# Patient Record
Sex: MALE | Race: WHITE | Employment: FULL TIME | URBAN - METROPOLITAN AREA
[De-identification: names, ages, dates, MRNs, and addresses within clinical notes are randomized per-mention and may not be internally consistent; named-entity substitution may affect disease eponyms.]

---

## 2017-01-03 PROBLEM — R07.9 CHEST PAIN: Status: ACTIVE | Noted: 2017-01-03

## 2017-01-03 PROBLEM — R31.9 HEMATURIA: Status: ACTIVE | Noted: 2017-01-03

## 2021-09-04 ENCOUNTER — HOSPITAL ENCOUNTER (EMERGENCY)
Age: 51
Discharge: HOME | End: 2021-09-04
Attending: EMERGENCY MEDICINE
Payer: COMMERCIAL

## 2021-09-04 ENCOUNTER — APPOINTMENT (OUTPATIENT)
Dept: GENERAL RADIOLOGY | Age: 51
End: 2021-09-04
Attending: EMERGENCY MEDICINE
Payer: COMMERCIAL

## 2021-09-04 VITALS
HEIGHT: 74 IN | TEMPERATURE: 99 F | RESPIRATION RATE: 18 BRPM | DIASTOLIC BLOOD PRESSURE: 69 MMHG | OXYGEN SATURATION: 99 % | WEIGHT: 245 LBS | HEART RATE: 83 BPM | SYSTOLIC BLOOD PRESSURE: 120 MMHG | BODY MASS INDEX: 31.44 KG/M2

## 2021-09-04 DIAGNOSIS — U07.1 COVID-19: Primary | ICD-10-CM

## 2021-09-04 LAB
ANION GAP SERPL CALC-SCNC: 8 MMOL/L (ref 7–16)
BASOPHILS # BLD: 0 K/UL (ref 0–0.2)
BASOPHILS NFR BLD: 0 % (ref 0–2)
BUN SERPL-MCNC: 16 MG/DL (ref 6–23)
CALCIUM SERPL-MCNC: 8.5 MG/DL (ref 8.3–10.4)
CHLORIDE SERPL-SCNC: 98 MMOL/L (ref 98–107)
CO2 SERPL-SCNC: 25 MMOL/L (ref 21–32)
CREAT SERPL-MCNC: 1.13 MG/DL (ref 0.8–1.5)
DIFFERENTIAL METHOD BLD: ABNORMAL
EOSINOPHIL # BLD: 0 K/UL (ref 0–0.8)
EOSINOPHIL NFR BLD: 0 % (ref 0.5–7.8)
ERYTHROCYTE [DISTWIDTH] IN BLOOD BY AUTOMATED COUNT: 12.5 % (ref 11.9–14.6)
GLUCOSE SERPL-MCNC: 102 MG/DL (ref 65–100)
HCT VFR BLD AUTO: 37.2 % (ref 41.1–50.3)
HGB BLD-MCNC: 13 G/DL (ref 13.6–17.2)
IMM GRANULOCYTES # BLD AUTO: 0 K/UL (ref 0–0.5)
IMM GRANULOCYTES NFR BLD AUTO: 0 % (ref 0–5)
LYMPHOCYTES # BLD: 0.6 K/UL (ref 0.5–4.6)
LYMPHOCYTES NFR BLD: 16 % (ref 13–44)
MCH RBC QN AUTO: 32.4 PG (ref 26.1–32.9)
MCHC RBC AUTO-ENTMCNC: 34.9 G/DL (ref 31.4–35)
MCV RBC AUTO: 92.8 FL (ref 79.6–97.8)
MONOCYTES # BLD: 0.2 K/UL (ref 0.1–1.3)
MONOCYTES NFR BLD: 5 % (ref 4–12)
NEUTS SEG # BLD: 3.1 K/UL (ref 1.7–8.2)
NEUTS SEG NFR BLD: 79 % (ref 43–78)
NRBC # BLD: 0 K/UL (ref 0–0.2)
PLATELET # BLD AUTO: 136 K/UL (ref 150–450)
PMV BLD AUTO: 10.6 FL (ref 9.4–12.3)
POTASSIUM SERPL-SCNC: 3.3 MMOL/L (ref 3.5–5.1)
RBC # BLD AUTO: 4.01 M/UL (ref 4.23–5.6)
SODIUM SERPL-SCNC: 131 MMOL/L (ref 136–145)
WBC # BLD AUTO: 4 K/UL (ref 4.3–11.1)

## 2021-09-04 PROCEDURE — 99283 EMERGENCY DEPT VISIT LOW MDM: CPT

## 2021-09-04 PROCEDURE — 71046 X-RAY EXAM CHEST 2 VIEWS: CPT

## 2021-09-04 PROCEDURE — 74011000258 HC RX REV CODE- 258: Performed by: EMERGENCY MEDICINE

## 2021-09-04 PROCEDURE — 96375 TX/PRO/DX INJ NEW DRUG ADDON: CPT

## 2021-09-04 PROCEDURE — 96374 THER/PROPH/DIAG INJ IV PUSH: CPT

## 2021-09-04 PROCEDURE — 74011250636 HC RX REV CODE- 250/636: Performed by: EMERGENCY MEDICINE

## 2021-09-04 PROCEDURE — 74011250637 HC RX REV CODE- 250/637: Performed by: EMERGENCY MEDICINE

## 2021-09-04 PROCEDURE — 74011000636 HC RX REV CODE- 636: Performed by: EMERGENCY MEDICINE

## 2021-09-04 PROCEDURE — 80048 BASIC METABOLIC PNL TOTAL CA: CPT

## 2021-09-04 PROCEDURE — 85025 COMPLETE CBC W/AUTO DIFF WBC: CPT

## 2021-09-04 PROCEDURE — M0243 CASIRIVI AND IMDEVI INFUSION: HCPCS

## 2021-09-04 RX ORDER — KETOROLAC TROMETHAMINE 30 MG/ML
30 INJECTION, SOLUTION INTRAMUSCULAR; INTRAVENOUS
Status: COMPLETED | OUTPATIENT
Start: 2021-09-04 | End: 2021-09-04

## 2021-09-04 RX ORDER — DEXAMETHASONE SODIUM PHOSPHATE 100 MG/10ML
10 INJECTION INTRAMUSCULAR; INTRAVENOUS ONCE
Status: COMPLETED | OUTPATIENT
Start: 2021-09-04 | End: 2021-09-04

## 2021-09-04 RX ORDER — ACETAMINOPHEN 500 MG
1000 TABLET ORAL
Status: COMPLETED | OUTPATIENT
Start: 2021-09-04 | End: 2021-09-04

## 2021-09-04 RX ADMIN — SODIUM CHLORIDE 1000 ML: 900 INJECTION, SOLUTION INTRAVENOUS at 21:34

## 2021-09-04 RX ADMIN — DEXAMETHASONE SODIUM PHOSPHATE 10 MG: 10 INJECTION INTRAMUSCULAR; INTRAVENOUS at 21:35

## 2021-09-04 RX ADMIN — CASIRIVIMAB AND IMDEVIMAB: 600; 600 INJECTION, SOLUTION, CONCENTRATE INTRAVENOUS at 22:03

## 2021-09-04 RX ADMIN — ACETAMINOPHEN 1000 MG: 500 TABLET, FILM COATED ORAL at 17:31

## 2021-09-04 RX ADMIN — KETOROLAC TROMETHAMINE 30 MG: 30 INJECTION, SOLUTION INTRAMUSCULAR; INTRAVENOUS at 21:34

## 2021-09-04 NOTE — ED TRIAGE NOTES
Pt here on day 7 of COVID. Feels as if he is getting confused at times and increased SOB with exertion. Did not get vaccines for covid. 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 Has no prior health conditions. Masked.

## 2021-09-05 NOTE — ED PROVIDER NOTES
Chief complaint : Covid concerns    HISTORY OF PRESENT ILLNESS :  Location : Generalized fatigue, fevers    Quality : Feels \"foggy headed    Quantity : Constant    Timing : About a week    Severity : Moderate    Context : Tested positive at [facility]    Alleviating / exacerbating factors : Little relief with Tylenol and Advil    Associated Symptoms : Diarrhea is resolved  Exertional dyspnea             Past Medical History:   Diagnosis Date    Gout     Hematuria, microscopic     Hypercholesterolemia        No past surgical history on file. Family History:   Problem Relation Age of Onset    Coronary Artery Disease Father        Social History     Socioeconomic History    Marital status:      Spouse name: Not on file    Number of children: Not on file    Years of education: Not on file    Highest education level: Not on file   Occupational History    Not on file   Tobacco Use    Smoking status: Former Smoker    Smokeless tobacco: Never Used    Tobacco comment: SMOKED PIPE   Substance and Sexual Activity    Alcohol use: Yes    Drug use: Not on file    Sexual activity: Not on file   Other Topics Concern    Not on file   Social History Narrative    Not on file     Social Determinants of Health     Financial Resource Strain:     Difficulty of Paying Living Expenses:    Food Insecurity:     Worried About Running Out of Food in the Last Year:     [address] in the Last Year:    Transportation Needs:     Lack of Transportation (Medical):      Lack of Transportation (Non-Medical):    Physical Activity:     Days of Exercise per Week:     Minutes of Exercise per Session:    Stress:     Feeling of Stress :    Social Connections:     Frequency of Communication with Friends and Family:     Frequency of Social Gatherings with Friends and Family:     Attends Tenri[employer] Services:     Active Member of Clubs or Organizations:     Attends Club or Organization Meetings:     Marital Status: Intimate Partner Violence:     Fear of Current or Ex-Partner:     Emotionally Abused:     Physically Abused:     Sexually Abused: ALLERGIES: Patient has no known allergies. Review of Systems   Constitutional: Positive for activity change, appetite change, fatigue and fever. Negative for chills. HENT: Negative for congestion, rhinorrhea and sore throat. Eyes: Negative for discharge and redness. Respiratory: Positive for shortness of breath. Negative for cough. Cardiovascular: Negative for chest pain and palpitations. Gastrointestinal: Negative for abdominal pain, diarrhea (Resolved), nausea and vomiting. Musculoskeletal: Negative for arthralgias, back pain and myalgias. Skin: Negative for rash. Neurological: Negative for dizziness and headaches. Psychiatric/Behavioral: Positive for decreased concentration. All other systems reviewed and are negative. Vitals:    09/04/21 1657   BP: 120/69   Pulse: 83   Resp: 18   Temp: (!) 102 °F (38.9 °C)   SpO2: 96%   Weight: 111.1 kg (245 lb)   Height: 6' 2\" (1.88 m)            Physical Exam  Vitals and nursing note reviewed. Constitutional:       General: He is not in acute distress. Appearance: Normal appearance. He is well-developed. He is not ill-appearing, toxic-appearing or diaphoretic. HENT:      Head: Normocephalic and atraumatic. Right Ear: External ear normal.      Left Ear: External ear normal.   Eyes:      General: No scleral icterus. Right eye: No discharge. Left eye: No discharge. Extraocular Movements: Extraocular movements intact. Conjunctiva/sclera: Conjunctivae normal.      Pupils: Pupils are equal, round, and reactive to light. Neck:      Thyroid: No thyromegaly. Trachea: Trachea normal.   Cardiovascular:      Rate and Rhythm: Normal rate and regular rhythm. Heart sounds: Normal heart sounds. No murmur heard. No gallop.     Pulmonary:      Effort: Pulmonary effort is normal. No respiratory distress. Breath sounds: Normal breath sounds. No wheezing or rales. Abdominal:      Palpations: Abdomen is soft. There is no hepatomegaly, splenomegaly or pulsatile mass. Tenderness: There is no abdominal tenderness. There is no guarding. Musculoskeletal:         General: Normal range of motion. Cervical back: Normal range of motion and neck supple. Normal range of motion. Lymphadenopathy:      Cervical: No cervical adenopathy. Skin:     General: Skin is warm and dry. Neurological:      General: No focal deficit present. Mental Status: He is alert and oriented to person, place, and time. Mental status is at baseline. Motor: No abnormal muscle tone. Comments: cni 2-12 grossly   Psychiatric:         Mood and Affect: Mood normal.         Behavior: Behavior normal.          MDM  Number of Diagnoses or Management Options  COVID-19: established and worsening  Diagnosis management comments: Medical decision making note:  [id number] with ongoing fevers, worsening dyspnea, and brain fog. Patient able to walk in place for 2 minutes on room air sats do not go below 95%. Based on his BMI he qualifies for Regeneron monoclonal antibody therapy. X-ray clear  This concludes the \"medical decision making note\" part of this emergency department visit note.          Amount and/or Complexity of Data Reviewed  Clinical lab tests: ordered and reviewed  Tests in the radiology section of CPT®: ordered and reviewed (XR CHEST PA LAT   Final Result    Minimal patchy infiltrate in the left midlung)  Decide to obtain previous medical records or to obtain history from someone other than the patient: yes    Risk of Complications, Morbidity, and/or Mortality  Presenting problems: moderate  Diagnostic procedures: low  Management options: low    Patient Progress  Patient progress: improved         Procedures

## 2021-10-18 PROBLEM — Z12.11 COLON CANCER SCREENING: Status: ACTIVE | Noted: 2021-10-18

## 2021-10-18 PROBLEM — R31.29 MICROSCOPIC HEMATURIA: Status: ACTIVE | Noted: 2021-10-18

## 2021-10-18 PROBLEM — Z12.5 PROSTATE CANCER SCREENING: Status: ACTIVE | Noted: 2021-10-18

## 2021-10-18 PROBLEM — Z86.16 HISTORY OF COVID-19: Status: ACTIVE | Noted: 2021-09-01

## 2022-02-16 PROBLEM — Z00.00 ENCOUNTER FOR PREVENTIVE HEALTH EXAMINATION: Status: ACTIVE | Noted: 2022-02-16

## 2022-02-16 PROBLEM — R31.9 HEMATURIA: Status: RESOLVED | Noted: 2017-01-03 | Resolved: 2022-02-16

## 2022-02-16 PROBLEM — Z23 ENCOUNTER FOR IMMUNIZATION: Status: ACTIVE | Noted: 2022-02-16

## 2022-02-16 PROBLEM — R73.01 IMPAIRED FASTING GLUCOSE: Status: ACTIVE | Noted: 2022-02-16

## 2022-02-16 PROBLEM — E66.9 OBESITY: Status: ACTIVE | Noted: 2017-01-03

## 2022-03-18 PROBLEM — E66.9 OBESITY: Status: ACTIVE | Noted: 2017-01-03

## 2022-03-19 PROBLEM — Z00.00 ENCOUNTER FOR PREVENTIVE HEALTH EXAMINATION: Status: RESOLVED | Noted: 2022-02-16 | Resolved: 2022-03-19

## 2022-03-19 PROBLEM — Z12.5 PROSTATE CANCER SCREENING: Status: ACTIVE | Noted: 2021-10-18

## 2022-03-19 PROBLEM — Z12.5 PROSTATE CANCER SCREENING: Status: RESOLVED | Noted: 2021-10-18 | Resolved: 2022-03-19

## 2022-03-19 PROBLEM — Z12.11 COLON CANCER SCREENING: Status: RESOLVED | Noted: 2021-10-18 | Resolved: 2022-03-19

## 2022-03-19 PROBLEM — R31.29 MICROSCOPIC HEMATURIA: Status: ACTIVE | Noted: 2021-10-18

## 2022-03-19 PROBLEM — Z23 ENCOUNTER FOR IMMUNIZATION: Status: ACTIVE | Noted: 2022-02-16

## 2022-03-19 PROBLEM — Z00.00 ENCOUNTER FOR PREVENTIVE HEALTH EXAMINATION: Status: ACTIVE | Noted: 2022-02-16

## 2022-03-19 PROBLEM — Z86.16 HISTORY OF COVID-19: Status: ACTIVE | Noted: 2021-09-01

## 2022-03-19 PROBLEM — Z12.11 COLON CANCER SCREENING: Status: ACTIVE | Noted: 2021-10-18

## 2022-03-19 PROBLEM — R07.9 CHEST PAIN: Status: ACTIVE | Noted: 2017-01-03

## 2022-03-20 PROBLEM — R73.01 IMPAIRED FASTING GLUCOSE: Status: ACTIVE | Noted: 2022-02-16

## 2022-04-27 DIAGNOSIS — E78.2 MIXED HYPERLIPIDEMIA: Primary | ICD-10-CM

## 2022-04-27 DIAGNOSIS — R73.01 IMPAIRED FASTING GLUCOSE: ICD-10-CM

## 2022-07-14 ENCOUNTER — OFFICE VISIT (OUTPATIENT)
Dept: INTERNAL MEDICINE CLINIC | Facility: CLINIC | Age: 52
End: 2022-07-14
Payer: COMMERCIAL

## 2022-07-14 VITALS
TEMPERATURE: 98.4 F | BODY MASS INDEX: 33.75 KG/M2 | DIASTOLIC BLOOD PRESSURE: 84 MMHG | WEIGHT: 263 LBS | SYSTOLIC BLOOD PRESSURE: 143 MMHG | HEIGHT: 74 IN | OXYGEN SATURATION: 98 % | HEART RATE: 66 BPM

## 2022-07-14 DIAGNOSIS — L98.9 SKIN LESIONS: ICD-10-CM

## 2022-07-14 PROCEDURE — 99213 OFFICE O/P EST LOW 20 MIN: CPT | Performed by: INTERNAL MEDICINE

## 2022-07-14 RX ORDER — DOXYCYCLINE HYCLATE 100 MG
100 TABLET ORAL 2 TIMES DAILY
Qty: 20 TABLET | Refills: 0 | Status: SHIPPED | OUTPATIENT
Start: 2022-07-14 | End: 2022-07-24

## 2022-07-14 ASSESSMENT — ENCOUNTER SYMPTOMS
VOICE CHANGE: 0
CHOKING: 0
STRIDOR: 0
RECTAL PAIN: 0
EYE PAIN: 0

## 2022-07-14 NOTE — PROGRESS NOTES
FOLLOWUP VISIT    Subjective:    Mr. Bhavana Haas is a 46 y.o., male,   Chief Complaint   Patient presents with    Rash     c/o itchy raised patch x2wk       HPI:    Patient has two skin issues. First, asymptomatic small white bump on right hand in the web space between the index and middle finger MCP joints. Present x months. Not changing. Second, for several weeks has had migrating itchy red lesions only on hairy areas of lower extremities and lower abdomen. No fever or chills. Itchy at times. The following portions of the patient's history were reviewed and updated as appropriate:      Past Medical History:   Diagnosis Date    Gout     Hematuria, microscopic     History of COVID-19 09/2021    Hypercholesterolemia     Hyperlipidemia     Impaired fasting glucose     Microscopic hematuria 10/18/2021       No past surgical history on file. Family History   Problem Relation Age of Onset    Coronary Art Dis Father     Heart Attack Father 72    Stroke Mother     Hypertension Mother        Social History     Socioeconomic History    Marital status:      Spouse name: Not on file    Number of children: Not on file    Years of education: Not on file    Highest education level: Not on file   Occupational History    Not on file   Tobacco Use    Smoking status: Never Smoker    Smokeless tobacco: Never Used    Tobacco comment: Quit smoking: SMOKED PIPE   Substance and Sexual Activity    Alcohol use: Not Currently    Drug use: Never    Sexual activity: Not on file   Other Topics Concern    Not on file   Social History Narrative         ** Merged History Encounter **     Social Determinants of Health     Financial Resource Strain:     Difficulty of Paying Living Expenses: Not on file   Food Insecurity:     Worried About [address] in the Last Year: Not on file    Ishan of Food in the Last Year: Not on file   Transportation Needs:     Lack of Transportation (Medical):  Not on file    Lack of Transportation (Non-Medical): Not on file   Physical Activity:     Days of Exercise per Week: Not on file    Minutes of Exercise per Session: Not on file   Stress:     Feeling of Stress : Not on file   Social Connections:     Frequency of Communication with Friends and Family: Not on file    Frequency of Social Gatherings with Friends and Family: Not on file    Attends Muslim Services: Not on file    Active Member of [address] [place] [address] [employer] or Organizations: Not on file    Attends Club or Organization Meetings: Not on file    Marital Status: Not on file   Intimate Partner Violence:     Fear of Current or Ex-Partner: Not on file    Emotionally Abused: Not on file    Physically Abused: Not on file    Sexually Abused: Not on file   Housing Stability:     Unable to Pay for Housing in the Last Year: Not on file    Number of [place] in the Last Year: Not on file    Unstable Housing in the Last Year: Not on file       Current Outpatient Medications   Medication Sig Dispense Refill    doxycycline hyclate (VIBRA-TABS) 100 MG tablet Take 1 tablet by mouth 2 times daily for 10 days 20 tablet 0    colchicine (COLCRYS) 0.6 MG tablet Take 0.6 mg by mouth daily as needed      rosuvastatin (CRESTOR) 20 MG tablet Take 20 mg by mouth       No current facility-administered medications for this visit. Allergies as of 07/14/2022    (No Known Allergies)       Review of Systems   Constitutional: Negative for activity change and appetite change. HENT: Negative for drooling and voice change. Eyes: Negative for pain. Respiratory: Negative for choking and stridor. Gastrointestinal: Negative for rectal pain. Endocrine: Negative for polydipsia and polyphagia. Genitourinary: Negative for enuresis and penile pain. Musculoskeletal: Negative for gait problem and neck stiffness. Skin: Negative for pallor. Allergic/Immunologic: Negative for immunocompromised state.    Neurological: Negative for facial asymmetry and speech difficulty. Hematological: Does not bruise/bleed easily. Psychiatric/Behavioral: Negative for self-injury. The patient is not hyperactive. Patient Care Team:  Mitul Reza MD as PCP - General  Mitul Reza MD as PCP - [facility]    Objective:    BP (!) 143/84 (Site: Left Upper Arm, Position: Sitting)   Pulse 66   Temp 98.4 °F (36.9 °C) (Temporal)   Ht 6' 2\" (1.88 m)   Wt 263 lb (119.3 kg)   SpO2 98%   BMI 33.77 kg/m²     Physical Exam  Constitutional:       General: He is not in acute distress. Appearance: He is not toxic-appearing. HENT:      Head: Normocephalic and atraumatic. Nose: Nose normal.   Eyes:      Extraocular Movements: Extraocular movements intact. Conjunctiva/sclera: Conjunctivae normal.   Pulmonary:      Effort: Pulmonary effort is normal. No respiratory distress. Breath sounds: No stridor. Skin:     Coloration: Skin is not jaundiced or pale. Comments: 2 mm circular white dome shaped papule between index and middle finger MCP joints. On lower extremities are several small < 1 cm areas of non blanchable redness that might be around hair follicles. Neurological:      Mental Status: He is alert and oriented to person, place, and time. Psychiatric:         Thought Content: Thought content normal.              Legacy Historical Encounter on 02/09/2022   Component Date Value Ref Range Status    Cholesterol, Total 02/09/2022 139  100 - 199 mg/dL Final    Triglycerides 02/09/2022 137  0 - 149 mg/dL Final    HDL 02/09/2022 33* >39 mg/dL Final    VLDL 02/09/2022 24  5 - 40 mg/dL Final    LDL Calculated 02/09/2022 82  0 - 99 mg/dL Final    PSA 02/09/2022 0.5  0.0 - 4.0 ng/mL Final    Comment: Roche ECLIA methodology. According to the American Urological Association, Serum PSA should  decrease and remain at undetectable levels after radical  prostatectomy.  The AUA defines biochemical recurrence as an initial  PSA value 0.2 ng/mL or greater followed by a subsequent confirmatory  PSA value 0.2 ng/mL or greater. Values obtained with different assay methods or kits cannot be used  interchangeably. Results cannot be interpreted as absolute evidence  of the presence or absence of malignant disease.  Glucose 02/09/2022 100* 65 - 99 mg/dL Final    BUN 02/09/2022 16  6 - 24 mg/dL Final    CREATININE 02/09/2022 1.08  0.76 - 1.27 mg/dL Final    EGFR IF NonAfrican American 02/09/2022 79  >59 mL/min/1.73 Final    GFR  02/09/2022 91  >59 mL/min/1.73 Final    Comment: **In accordance with recommendations from the NKF-ASN Task force,**    [employer] is in the process of updating its eGFR calculation to the    2021 CKD-EPI creatinine equation that estimates kidney function    without a race variable.       Bun/Cre Ratio 02/09/2022 15  9 - 20 NA Final    Sodium 02/09/2022 141  134 - 144 mmol/L Final    Potassium 02/09/2022 4.8  3.5 - 5.2 mmol/L Final    Chloride 02/09/2022 100  96 - 106 mmol/L Final    CO2 02/09/2022 21  20 - 29 mmol/L Final    Calcium 02/09/2022 10.0  8.7 - 10.2 mg/dL Final    Total Protein 02/09/2022 7.8  6.0 - 8.5 g/dL Final    Albumin 02/09/2022 4.8  3.8 - 4.9 g/dL Final    Globulin, Total 02/09/2022 3.0  1.5 - 4.5 g/dL Final    Albumin/Globulin Ratio 02/09/2022 1.6  1.2 - 2.2 NA Final    Total Bilirubin 02/09/2022 0.5  0.0 - 1.2 mg/dL Final    Alkaline Phosphatase 02/09/2022 70  44 - 121 IU/L Final    AST 02/09/2022 28  0 - 40 IU/L Final    ALT 02/09/2022 40  0 - 44 IU/L Final    Uric Acid, Serum 02/09/2022 7.4  3.8 - 8.4 mg/dL Final               Therapeutic target for gout patients: <6.0    WBC 02/09/2022 6.9  3.4 - 10.8 x10E3/uL Final    RBC 02/09/2022 4.74  4.14 - 5.80 x10E6/uL Final    Hemoglobin 02/09/2022 15.3  13.0 - 17.7 g/dL Final    Hematocrit 02/09/2022 45.5  37.5 - 51.0 % Final    MCV 02/09/2022 96  79 - 97 fL Final    MCH 02/09/2022 32.3  26.6 - 33.0 pg Final    MCHC 02/09/2022 33.6  31.5 - 35.7 g/dL Final    RDW 02/09/2022 13.5  11.6 - 15.4 % Final    Platelets 19/18/4541 227  150 - 450 x10E3/uL Final    Neutrophils % 02/09/2022 56  Not Estab. % Final    Lymphocytes % 02/09/2022 26  Not Estab. % Final    Monocytes % 02/09/2022 15  Not Estab. % Final    Eosinophils % 02/09/2022 3  Not Estab. % Final    Basophils % 02/09/2022 0  Not Estab. % Final    Neutrophils Absolute 02/09/2022 3.8  1.4 - 7.0 x10E3/uL Final    Lymphocytes Absolute 02/09/2022 1.8  0.7 - 3.1 x10E3/uL Final    Monocytes Absolute 02/09/2022 1.1* 0.1 - 0.9 x10E3/uL Final    Eosinophils Absolute 02/09/2022 0.2  0.0 - 0.4 x10E3/uL Final    Basophils Absolute 02/09/2022 0.0  0.0 - 0.2 x10E3/uL Final    Immature Granulocytes 02/09/2022 0  Not Estab. % Final    GRANULOCYTE ABSOLUTE COUNT 02/09/2022 0.0  0.0 - 0.1 x10E3/uL Final         Assessent & Plan:        1. Skin lesions  Overview:  Refer to dermatology. Empiric treatment with doxycycline for possible folliculitis. Orders:  -     doxycycline hyclate (VIBRA-TABS) 100 MG tablet; Take 1 tablet by mouth 2 times daily for 10 days, Disp-20 tablet, R-[facility]     MIAH Solano MD, PA        The patient and/or patient representative voiced understanding and agreement with the current diagnoses, recommendations, and possible side effects. Return for appointment as previously scheduled.

## 2022-07-19 ENCOUNTER — PREP FOR PROCEDURE (OUTPATIENT)
Dept: SURGERY | Age: 52
End: 2022-07-19

## 2022-08-03 RX ORDER — SODIUM CHLORIDE 9 MG/ML
INJECTION, SOLUTION INTRAVENOUS PRN
Status: CANCELLED | OUTPATIENT
Start: 2022-08-03

## 2022-08-03 RX ORDER — SODIUM CHLORIDE 0.9 % (FLUSH) 0.9 %
5-40 SYRINGE (ML) INJECTION EVERY 12 HOURS SCHEDULED
Status: CANCELLED | OUTPATIENT
Start: 2022-08-03

## 2022-08-03 RX ORDER — SODIUM CHLORIDE 0.9 % (FLUSH) 0.9 %
5-40 SYRINGE (ML) INJECTION PRN
Status: CANCELLED | OUTPATIENT
Start: 2022-08-03

## 2022-08-16 ENCOUNTER — ANESTHESIA EVENT (OUTPATIENT)
Dept: ENDOSCOPY | Age: 52
End: 2022-08-16
Payer: COMMERCIAL

## 2022-08-16 RX ORDER — ONDANSETRON 2 MG/ML
4 INJECTION INTRAMUSCULAR; INTRAVENOUS
Status: CANCELLED | OUTPATIENT
Start: 2022-08-16 | End: 2022-08-16

## 2022-08-16 NOTE — PERIOP NOTE
Patient verified name, , and procedure. Type: 1a; abbreviated assessment per anesthesia guidelines    Labs per anesthesia: none    Instructed pt that they will be notified the day before their procedure by the GI Lab for time of arrival if their procedure is [facility] and Pre-op for [place] cases. Arrival times should be called by 5 pm. If no phone is received the patient should contact their respective hospital. The GI lab telephone number is [phone] and ES Pre-op is [phone]. Follow diet and prep instructions per office including NPO status. If patient has NOT received instructions from office patient is advised to call surgeon office, verbalizes understanding. Bath or shower the night before and the am of surgery with non-mositurizing soap. No lotions, oils, powders, cologne on skin. No make up, eye make up or jewelry. Wear loose fitting comfortable, clean clothing. Must have adult present in building the entire time . Medications for the day of procedure Crestor and Colchicine as instructed if needed. , patient to hold all vitamins, supplements, and NSAIDs. The following discharge instructions reviewed with patient: medication given during procedure may cause drowsiness for several hours, therefore, do not drive or operate machinery for remainder of the day. You may not drink alcohol on the day of your procedure, please resume regular diet and activity unless otherwise directed. You may experience abdominal distention for several hours that is relieved by the passage of gas. Contact your physician if you have any of the following: fever or chills, severe abdominal pain or excessive amount of bleeding or a large amount when having a bowel movement.  Occasional specks of blood with bowel movement would not be unusual.

## 2022-08-17 ENCOUNTER — ANESTHESIA (OUTPATIENT)
Dept: ENDOSCOPY | Age: 52
End: 2022-08-17
Payer: COMMERCIAL

## 2022-08-17 ENCOUNTER — HOSPITAL ENCOUNTER (OUTPATIENT)
Age: 52
Setting detail: OUTPATIENT SURGERY
Discharge: HOME | End: 2022-08-17
Attending: SURGERY | Admitting: SURGERY
Payer: COMMERCIAL

## 2022-08-17 VITALS
HEIGHT: 74 IN | TEMPERATURE: 98.6 F | BODY MASS INDEX: 32.08 KG/M2 | SYSTOLIC BLOOD PRESSURE: 125 MMHG | OXYGEN SATURATION: 98 % | WEIGHT: 250 LBS | RESPIRATION RATE: 18 BRPM | DIASTOLIC BLOOD PRESSURE: 68 MMHG | HEART RATE: 59 BPM

## 2022-08-17 PROCEDURE — 7100000010 HC PHASE II RECOVERY - FIRST 15 MIN: Performed by: SURGERY

## 2022-08-17 PROCEDURE — 2500000003 HC RX 250 WO HCPCS: Performed by: STUDENT IN AN ORGANIZED HEALTH CARE EDUCATION/TRAINING PROGRAM

## 2022-08-17 PROCEDURE — 2709999900 HC NON-CHARGEABLE SUPPLY: Performed by: SURGERY

## 2022-08-17 PROCEDURE — 3609027000 HC COLONOSCOPY: Performed by: SURGERY

## 2022-08-17 PROCEDURE — 6360000002 HC RX W HCPCS: Performed by: STUDENT IN AN ORGANIZED HEALTH CARE EDUCATION/TRAINING PROGRAM

## 2022-08-17 PROCEDURE — 3700000000 HC ANESTHESIA ATTENDED CARE: Performed by: SURGERY

## 2022-08-17 PROCEDURE — 7100000011 HC PHASE II RECOVERY - ADDTL 15 MIN: Performed by: SURGERY

## 2022-08-17 PROCEDURE — 45378 DIAGNOSTIC COLONOSCOPY: CPT | Performed by: SURGERY

## 2022-08-17 PROCEDURE — 3700000001 HC ADD 15 MINUTES (ANESTHESIA): Performed by: SURGERY

## 2022-08-17 PROCEDURE — 2580000003 HC RX 258: Performed by: ANESTHESIOLOGY

## 2022-08-17 RX ORDER — PROPOFOL 10 MG/ML
INJECTION, EMULSION INTRAVENOUS CONTINUOUS PRN
Status: DISCONTINUED | OUTPATIENT
Start: 2022-08-17 | End: 2022-08-17 | Stop reason: SDUPTHER

## 2022-08-17 RX ORDER — PROPOFOL 10 MG/ML
INJECTION, EMULSION INTRAVENOUS PRN
Status: DISCONTINUED | OUTPATIENT
Start: 2022-08-17 | End: 2022-08-17 | Stop reason: SDUPTHER

## 2022-08-17 RX ORDER — LIDOCAINE HYDROCHLORIDE 20 MG/ML
INJECTION, SOLUTION EPIDURAL; INFILTRATION; INTRACAUDAL; PERINEURAL PRN
Status: DISCONTINUED | OUTPATIENT
Start: 2022-08-17 | End: 2022-08-17 | Stop reason: SDUPTHER

## 2022-08-17 RX ORDER — LIDOCAINE HYDROCHLORIDE 10 MG/ML
1 INJECTION, SOLUTION INFILTRATION; PERINEURAL
Status: DISCONTINUED | OUTPATIENT
Start: 2022-08-17 | End: 2022-08-17 | Stop reason: HOSPADM

## 2022-08-17 RX ORDER — SODIUM CHLORIDE 9 MG/ML
INJECTION, SOLUTION INTRAVENOUS CONTINUOUS
Status: DISCONTINUED | OUTPATIENT
Start: 2022-08-17 | End: 2022-08-17 | Stop reason: HOSPADM

## 2022-08-17 RX ADMIN — PROPOFOL 200 MCG/KG/MIN: 10 INJECTION, EMULSION INTRAVENOUS at 07:59

## 2022-08-17 RX ADMIN — PROPOFOL 70 MG: 10 INJECTION, EMULSION INTRAVENOUS at 07:58

## 2022-08-17 RX ADMIN — SODIUM CHLORIDE: 900 INJECTION, SOLUTION INTRAVENOUS at 07:30

## 2022-08-17 RX ADMIN — LIDOCAINE HYDROCHLORIDE 50 MG: 20 INJECTION, SOLUTION EPIDURAL; INFILTRATION; INTRACAUDAL; PERINEURAL at 07:58

## 2022-08-17 ASSESSMENT — PAIN - FUNCTIONAL ASSESSMENT
PAIN_FUNCTIONAL_ASSESSMENT: NONE - DENIES PAIN

## 2022-08-17 NOTE — H&P
[facility]  Srinivasa Rocha is a 46 y.o. male Patient presents today for screening colonoscopy. Patient denies melena, denies hematochezia, denies abdominal or rectal pain. Patient states bowel habits are good, denies diarrhea or constipation. Good appetite, no unintentional weight loss noted. Denies N/V. Denies CP or SOB. Never had a colonoscopy     Does not  have a family history of colon cancer. Does not take blood thinners                Past Medical History:   Diagnosis Date    Gout      Hematuria, microscopic      History of COVID-19 09/2021    Hypercholesterolemia      Hyperlipidemia      Impaired fasting glucose      Microscopic hematuria 10/18/2021             Current Outpatient Medications   Medication Sig Dispense Refill    rosuvastatin (CRESTOR) 20 mg tablet Take 20 mg by mouth nightly. colchicine 0.6 mg tablet TAKE 1 TABLET BY MOUTH DAILY AS NEEDED FOR GOUT OR PAIN. (Patient not taking: Reported on 3/8/2022) 30 Tablet 0      No Known Allergies  History reviewed. No pertinent surgical history. Family History   Problem Relation Age of Onset    Hypertension Mother      Stroke Mother      Heart Attack Father 72    Coronary Art Dis Father        Social History           Tobacco Use    Smoking status: Never Smoker    Smokeless tobacco: Never Used    Tobacco comment: SMOKED PIPE   Substance Use Topics    Alcohol use: Not Currently         Review of Systems  A comprehensive review of systems was negative except for that written in the HPI. Physical Exam  Visit Vitals  BP (!) 153/91   Pulse 60   Temp 97.6 °F (36.4 °C)   Ht 6' 2\" (1.88 m)   Wt 263 lb 9.6 oz (119.6 kg)   SpO2 99%   BMI 33.84 kg/m²         Constitutional: Alert, oriented, cooperative patient in no acute distress; appears stated age    Eyes:Sclera are clear. EOMs intact  ENMT: no external lesions gross hearing normal; no obvious neck masses, no ear or lip lesions, nares normal  CV: RRR. Normal perfusion  Resp: No JVD.   Breathing is non-labored; no audible wheezing. GI: soft and non-distended     Musculoskeletal: unremarkable with normal function. No embolic signs or cyanosis. Neuro:  Oriented; moves all 4; no focal deficits  Psychiatric: normal affect and mood, no memory impairment        Labs:     Assessment/Plan:   Deonna Chin is a 46 y.o. male who has signs and symptoms consistent with need for screening colonoscopy. Patient verbalized understanding of importance for bowel prep. 1. Proceed with screening colonoscopy        I discussed the patient's condition and treatment options with the patient. I discussed risks of colonoscopy in language the patient could understand including bleeding, infection, aspiration, perforation, medication reaction, need for further endoscopy or surgery, abscess, fistula, SBO, DVT, PE, heart attack, stroke, renal failure, respiratory failure, ventilatory dependence, and death. The patient voiced understanding of all this and all questions were answered. Alternatives to colonoscopy were discussed also and risks of the alternatives. The patient requested that we proceed with colonoscopy. Informed consent was obtained. A copy of this note is sent to the referring physician.                Last signed by: Tomás Zavala,

## 2022-08-17 NOTE — ANESTHESIA POSTPROCEDURE EVALUATION
Department of Anesthesiology  Postprocedure Note    Patient: Deonna Chin  MRN: [id number]  YOB: 1970  Date of evaluation: 8/17/2022      Procedure Summary     Date: 08/17/22 Room / Location: [facility] ENDO 05 / [facility] ENDOSCOPY    Anesthesia Start: 1775 Anesthesia Stop: 4885    Procedure: COLORECTAL CANCER SCREENING, NOT HIGH RISK/ 34 (Lower GI Region) Diagnosis:       Colon cancer screening      ()    Surgeons: Tomás Zavala MD Responsible Provider: Fadi Fung MD    Anesthesia Type: MAC ASA Status: 2          Anesthesia Type: MAC    Violetta Phase I: Violetta Score: 10    Violetta Phase II: Violetta Score: 8      Anesthesia Post Evaluation    Patient location during evaluation: PACU  Patient participation: complete - patient participated  Level of consciousness: awake and alert  Airway patency: patent  Nausea & Vomiting: no nausea and no vomiting  Complications: no  Cardiovascular status: hemodynamically stable  Respiratory status: acceptable  Hydration status: euvolemic  Comments: Blood pressure (!) 98/57, pulse 66, temperature 98.6 °F (37 °C), temperature source Skin, resp. rate 18, height 6' 2\" (1.88 m), weight 250 lb (113.4 kg), SpO2 96 %.       Pt stable for discharge from PACU  Multimodal analgesia pain management approach

## 2022-08-17 NOTE — OP NOTE
Operative Note      Patient: Esther Muro  YOB: 1970  MRN: [id number]    Date of Procedure: 8/17/2022    Pre-Op Diagnosis:     Post-Op Diagnosis: Same and Normal exam       Procedure(s):  COLORECTAL CANCER SCREENING, NOT HIGH RISK/ 34    Surgeon(s):  Dale Lambert MD    Assistant:   * No surgical staff found *    Anesthesia: Monitor Anesthesia Care    Estimated Blood Loss (mL): Minimal    Complications: None    Specimens:   * No specimens in log *    Implants:  * No implants in log *      Drains: * No LDAs found *    Findings: as above    Detailed Description of Procedure:   Dictated   AK#[id number]    Electronically signed by Isaura Lawson MD on 8/17/2022 at 8:18 AM

## 2022-08-17 NOTE — PROGRESS NOTES
Date of Service: 01/05/2022    PULMONARY CLINIC NOTE      HISTORY OF PRESENT ILLNESS:    This is a known patient to my practice for several years.  I am seeing her today for followup, last saw me in November of last year.  Throughout the year, we had her on Ritalin and we had her on Adderall,  she always has had some excessive sleepiness.  She also has had mild sleep apnea syndrome, maintained on auto CPAP based on polysomnography in 2019 that suggested an apnea-hypopnea index of 9.  She has been always compliant with her CPAP.    Kimber lost her  abruptly.  She was seen by a psychotherapist.  She was on Lexapro at one time, was on Clonazepam.    When I saw her last, she was doing relatively okay, but a little groggy during the day and having difficulty falling asleep.  I wanted to give her Clonazepam and Sunosi.  Since her last visit, she increased her caffeine intake in the early morning hours.  She sleeps at night well and she has no issues.  She is very energetic during the day.  I obtained a nocturnal pulse ox with her CPAP.  The download on 12/28/2021 suggested pressure of 9, no leak, no events.  Use of close to 6 hours.  The nocturnal pulse ox trace around the same time suggested no sawtooth appearance or desaturation.  I was very comfortable with the finding.  I told her we should do nothing different at this time.    PAST MEDICAL, SOCIAL AND FAMILY HISTORY:  Reviewed.    MEDICATIONS:    Reviewed.      Her testing reviewed and interpreted by me.    PHYSICAL EXAMINATION:  GENERAL:  She is awake, alert, in no acute distress.  VITAL SIGNS:  Stable, documented in Epic.  NECK:  Supple.  HEART:  Regular rate and rhythm.  LUNGS:  Clear, no wheezes or rales.  CHEST:  Symmetrical with good air movement.    Testing reviewed and interpreted by me.    IMPRESSION:  1.  Mild sleep apnea syndrome.  2.  Insomnia, multifactorial.  3.  Anxiety.    RECOMMENDATION:  1.  No change in her care.  2.  No medication  Patient transferred out via wheelchair by Godfrey ePrez, CLAUDIA. VSS needed.  3.  Continue practicing good sleep hygiene.  Small amount of caffeine is acceptable.  4.  Stay on CPAP, it is adequate.  No desaturation and keep using it nightly.  5.  Reevaluate by me in 1 year or sooner if needed.  6.  Update supply through [facility].  She is comfortable with this plan.      Dictated By: Travon Alonzo MD  Signing Provider: Travon Alonzo MD    MR/myron ([id number])  DD: 01/05/2022 16:14:15 TD: 01/06/2022 08:52:48    Copy Sent To:     cc: Brittanie ADAMS

## 2022-08-17 NOTE — OP NOTE
[facility]  OPERATIVE REPORT    Name:  Sdaa Almaguer  MR#:  [id number]  :  1970  ACCOUNT #:  [de-identified]  DATE OF SERVICE:  2022    PREOPERATIVE DIAGNOSIS:  Screening colonoscopy. POSTOPERATIVE DIAGNOSIS:  Normal exam.    PROCEDURE PERFORMED:  Colonoscopy. SURGEON:   Dani Begum MD    ASSISTANT:  None. ANESTHESIA:  MAC.    COMPLICATIONS:  None. SPECIMENS REMOVED:  None. IMPLANTS:  None. ESTIMATED BLOOD LOSS:  None. COUNTS:  Correct. PROCEDURE:  After the patient was brought into the room, time-out was carried out and all were in agreement. He was rolled onto his left side and MAC anesthesia administered. Scope gently inserted into the rectum. Anus and rectum normal.  Scope passed under direct visualization to the level of cecum without difficulty. Slow withdrawal of over 7 minutes was done without findings of abnormality. As much air as possible was evacuated prior to removing the scope. The patient tolerated the procedure well.         Yadira Lundberg MD      [id number]  D:  2022 8:20  T:  2022 9:04  JOB #:  [id number]

## 2022-08-17 NOTE — DISCHARGE INSTRUCTIONS
Gastrointestinal Colonoscopy/Flexible Sigmoidoscopy - Lower Exam Discharge Instructions  Call Dr. Josseline Ibarra at [phone] for any problems or questions. Contact the doctors office for follow up appointment as directed  Medication may cause drowsiness for several hours, therefore, do not drive or operate machinery for remainder of the day. No alcohol today. Do not make any important decisions such signing legal paperwork. Ordinarily, you may resume regular diet and activity after exam unless otherwise specified by your physician. Because of air put into your colon during exam, you may experience some abdominal distension, relieved by the passage of gas, for several hours. Contact your physician if you have any of the following:  Excessive amount of bleeding - large amount when having a bowel movement. Occasional specks of blood with bowel movement would not be unusual.  Severe abdominal pain  Fever or Chills      Any additional instructions:   Repeat colonoscopy in 10 years        Instructions given to CHARLES Recio and other family members.

## 2022-08-17 NOTE — ANESTHESIA PRE PROCEDURE
Department of Anesthesiology  Preprocedure Note       Name:  Delaney Barakat   Age:  46 y.o.  :  1970                                          MRN:  [id number]         Date:  2022      Surgeon: Kim Phoenix):  Kristal Baker MD    Procedure: Procedure(s):  COLORECTAL CANCER SCREENING, NOT HIGH RISK/ 34    Medications prior to admission:   Prior to Admission medications    Medication Sig Start Date End Date Taking? Authorizing Provider   colchicine (COLCRYS) 0.6 MG tablet Take 0.6 mg by mouth daily as needed 21   Ar Automatic Reconciliation   rosuvastatin (CRESTOR) 20 MG tablet Take 20 mg by mouth in the morning. Ar Automatic Reconciliation       Current medications:    No current facility-administered medications for this encounter.        Allergies:  No Known Allergies    Problem List:    Patient Active Problem List   Diagnosis Code    Hyperlipidemia E78.5    Obesity E66.9    Chest pain R07.9    History of COVID-19 Z86.16    Encounter for immunization Z23    Microscopic hematuria R31.29    Gout M10.9    Impaired fasting glucose R73.01    Skin lesions L98.9       Past Medical History:        Diagnosis Date    Gout     managed well with  meds    History of COVID-19 2021    received antibody infusion in ER no hospitalization    Hypercholesterolemia     Hyperlipidemia     Impaired fasting glucose     Microscopic hematuria 10/18/2021       Past Surgical History:        Procedure Laterality Date    WISDOM TOOTH EXTRACTION         Social History:    Social History     Tobacco Use    Smoking status: Never    Smokeless tobacco: Never    Tobacco comments:     Quit smoking: SMOKED PIPE   Substance Use Topics    Alcohol use: Yes     Comment: rarely                                Counseling given: Not Answered  Tobacco comments: Quit smoking: SMOKED PIPE      Vital Signs (Current):   Vitals:    22 1014   Weight: 250 lb (113.4 kg)   Height: 6' 2\" (1.88 m) BP Readings from Last 3 Encounters:   07/14/22 (!) 143/84   03/08/22 (!) 153/91   02/16/22 122/80       NPO Status:                                                                                 BMI:   Wt Readings from Last 3 Encounters:   08/16/22 250 lb (113.4 kg)   07/14/22 263 lb (119.3 kg)   03/08/22 263 lb 9.6 oz (119.6 kg)     Body mass index is 32.1 kg/m². CBC:   Lab Results   Component Value Date/Time    WBC 6.9 02/09/2022 09:09 AM    RBC 4.74 02/09/2022 09:09 AM    HGB 15.3 02/09/2022 09:09 AM    HCT 45.5 02/09/2022 09:09 AM    MCV 96 02/09/2022 09:09 AM    RDW 13.5 02/09/2022 09:09 AM     02/09/2022 09:09 AM       CMP:   Lab Results   Component Value Date/Time     02/09/2022 09:09 AM    K 4.8 02/09/2022 09:09 AM     02/09/2022 09:09 AM    CO2 21 02/09/2022 09:09 AM    BUN 16 02/09/2022 09:09 AM    CREATININE 1.08 02/09/2022 09:09 AM    GFRAA 91 02/09/2022 09:09 AM    AGRATIO 1.6 02/09/2022 09:09 AM    GLUCOSE 100 02/09/2022 09:09 AM    PROT 7.8 02/09/2022 09:09 AM    CALCIUM 10.0 02/09/2022 09:09 AM    BILITOT 0.5 02/09/2022 09:09 AM    ALKPHOS 70 02/09/2022 09:09 AM    AST 28 02/09/2022 09:09 AM    ALT 40 02/09/2022 09:09 AM       POC Tests: No results for input(s): POCGLU, POCNA, POCK, POCCL, POCBUN, POCHEMO, POCHCT in the last 72 hours.     Coags: No results found for: PROTIME, INR, APTT    HCG (If Applicable): No results found for: PREGTESTUR, PREGSERUM, HCG, HCGQUANT     ABGs: No results found for: PHART, PO2ART, DOE1FRG, HYB3TED, BEART, G7IZKXGN     Type & Screen (If Applicable):  No results found for: LABABO, LABRH    Drug/Infectious Status (If Applicable):  No results found for: HIV, HEPCAB    COVID-19 Screening (If Applicable): No results found for: COVID19        Anesthesia Evaluation  Patient summary reviewed and Nursing notes reviewed  Airway: Mallampati: II  TM distance: >3 FB   Neck ROM: full  Mouth opening: > = 3 FB   Dental: normal exam Pulmonary: breath sounds clear to auscultation      Sleep apnea: Likely undiagnosed PINA. Cardiovascular:  Exercise tolerance: good (>4 METS),   (+) hyperlipidemia        Rhythm: regular  Rate: normal                    Neuro/Psych:   Negative Neuro/Psych ROS              GI/Hepatic/Renal:            ROS comment: Obesity. Endo/Other: Negative Endo/Other ROS                    Abdominal:             Vascular: negative vascular ROS. Other Findings:           Anesthesia Plan      TIVA     ASA 2       Induction: intravenous. Anesthetic plan and risks discussed with patient.                         Sruthi Hercules MD   8/17/2022

## 2023-01-23 ENCOUNTER — TELEMEDICINE (OUTPATIENT)
Dept: INTERNAL MEDICINE CLINIC | Facility: CLINIC | Age: 53
End: 2023-01-23
Payer: COMMERCIAL

## 2023-01-23 DIAGNOSIS — Z12.5 PROSTATE CANCER SCREENING: ICD-10-CM

## 2023-01-23 DIAGNOSIS — M1A.09X0 IDIOPATHIC CHRONIC GOUT OF MULTIPLE SITES WITHOUT TOPHUS: ICD-10-CM

## 2023-01-23 DIAGNOSIS — R31.29 MICROSCOPIC HEMATURIA: ICD-10-CM

## 2023-01-23 DIAGNOSIS — R73.01 IMPAIRED FASTING GLUCOSE: ICD-10-CM

## 2023-01-23 DIAGNOSIS — U07.1 COVID-19: Primary | ICD-10-CM

## 2023-01-23 DIAGNOSIS — E78.5 HYPERLIPIDEMIA, UNSPECIFIED HYPERLIPIDEMIA TYPE: ICD-10-CM

## 2023-01-23 DIAGNOSIS — E66.09 CLASS 1 OBESITY DUE TO EXCESS CALORIES WITH SERIOUS COMORBIDITY AND BODY MASS INDEX (BMI) OF 32.0 TO 32.9 IN ADULT: ICD-10-CM

## 2023-01-23 PROBLEM — Z86.16 HISTORY OF COVID-19: Status: RESOLVED | Noted: 2021-09-01 | Resolved: 2023-01-23

## 2023-01-23 PROBLEM — E66.9 OBESITY: Status: ACTIVE | Noted: 2017-01-03

## 2023-01-23 PROCEDURE — 99442 PR PHYS/QHP TELEPHONE EVALUATION 11-20 MIN: CPT | Performed by: INTERNAL MEDICINE

## 2023-01-23 RX ORDER — ROSUVASTATIN CALCIUM 20 MG/1
20 TABLET, COATED ORAL DAILY
Qty: 90 TABLET | Refills: 0 | Status: SHIPPED | OUTPATIENT
Start: 2023-01-23

## 2023-01-23 ASSESSMENT — ENCOUNTER SYMPTOMS
RECTAL PAIN: 0
EYE PAIN: 0
CHOKING: 0
VOICE CHANGE: 0
STRIDOR: 0

## 2023-01-23 NOTE — PROGRESS NOTES
FOLLOWUP VISIT    Subjective:    Mr. Nader Dove is a 46 y.o., male,   Chief Complaint   Patient presents with    Positive For Covid-19       HPI:    Layla 78, who was evaluated through a patient-initiated, synchronous (real-time) audio only encounter, and/or her healthcare decision maker, is aware that it is a billable service, with coverage as determined by his insurance carrier. He provided verbal consent to proceed: Yes. He has not had a related appointment within my department in the past 7 days or scheduled within the next 24 hours. The patient was located at Home: [address] [place] [address]. Provider was located at [facility] (Appt Dept): [address] [place] [address]  [facility],  [address] [place] [address]. Time spent: 12    This patient is a 77-year-old male who has had upper respiratory symptoms including fever, chills, cough, and fever for about 48 hours. He had a home COVID test today which was positive. He is overweight with hyperlipidemia and prediabetes and therefore at risk for severe disease. He requests antiviral therapy. He has not had an attack of gout in over 6 months and has not taken colchicine in over 6 months. He actually ran out of his rosuvastatin a month ago because he did not keep his appointment with me in August.  He would like to get back on the rosuvastatin and have his physical and labs rescheduled. He denies any severe symptoms such as shortness of breath.     The following portions of the patient's history were reviewed and updated as appropriate:      Past Medical History:   Diagnosis Date    Gout     History of COVID-19 09/2021    received antibody infusion in ER no hospitalization    Hypercholesterolemia     Hyperlipidemia     Impaired fasting glucose     Microscopic hematuria 10/18/2021       Past Surgical History:   Procedure Laterality Date    COLONOSCOPY N/A 8/17/2022    COLORECTAL CANCER SCREENING, NOT HIGH RISK/ 34 performed by Jo Parker MD at [facility]    WISDOM TOOTH EXTRACTION         Family History   Problem Relation Age of Onset    Coronary Art Dis Father     Heart Attack Father 72    Stroke Mother     Hypertension Mother        Social History     Socioeconomic History    Marital status:      Spouse name: Not on file    Number of children: Not on file    Years of education: Not on file    Highest education level: Not on file   Occupational History    Not on file   Tobacco Use    Smoking status: Never    Smokeless tobacco: Never    Tobacco comments:     Quit smoking: SMOKED PIPE   Vaping Use    Vaping Use: Never used   Substance and Sexual Activity    Alcohol use: Yes     Comment: rarely    Drug use: Never    Sexual activity: Not on file   Other Topics Concern    Not on file   Social History Narrative         ** Merged History Encounter **     Social Determinants of Health     Financial Resource Strain: Not on file   Food Insecurity: Not on file   Transportation Needs: Not on file   Physical Activity: Not on file   Stress: Not on file   Social Connections: Not on file   Intimate Partner Violence: Not on file   Housing Stability: Not on file       Current Outpatient Medications   Medication Sig Dispense Refill    nirmatrelvir/ritonavir (PAXLOVID) 20 x 150 MG & 10 x 100MG TBPK Take 3 tablets (two 150 mg nirmatrelvir and one 100 mg ritonavir tablets) by mouth every 12 hours for 5 days. (Patient has normal renal function) 30 tablet 0    rosuvastatin (CRESTOR) 20 MG tablet Take 1 tablet by mouth daily 90 tablet 0    colchicine (COLCRYS) 0.6 MG tablet Take 0.6 mg by mouth daily as needed       No current facility-administered medications for this visit. Allergies as of 01/23/2023    (No Known Allergies)       Review of Systems   Constitutional:  Negative for activity change and appetite change. HENT:  Negative for drooling and voice change. Eyes:  Negative for pain. Respiratory:  Negative for choking and stridor.     Gastrointestinal: Negative for rectal pain. Endocrine: Negative for polydipsia and polyphagia. Genitourinary:  Negative for enuresis and penile pain. Musculoskeletal:  Negative for gait problem and neck stiffness. Skin:  Negative for pallor. Allergic/Immunologic: Negative for immunocompromised state. Neurological:  Negative for facial asymmetry and speech difficulty. Hematological:  Does not bruise/bleed easily. Psychiatric/Behavioral:  Negative for self-injury. The patient is not hyperactive. Patient Care Team:  James Maloney MD as PCP - General  James Maloney MD as PCP - [facility] Provider    Objective: There were no vitals taken for this visit. Physical Exam  Constitutional:       Comments: The patient was speaking in full sentences without dyspnea or respiratory distress on the telephone. Physical examination otherwise not possible during this telephone encounter. [facility] Historical Encounter on 02/09/2022   Component Date Value Ref Range Status    Cholesterol, Total 02/09/2022 139  100 - 199 mg/dL Final    Triglycerides 02/09/2022 137  0 - 149 mg/dL Final    HDL 02/09/2022 33 (A)  >39 mg/dL Final    VLDL 02/09/2022 24  5 - 40 mg/dL Final    LDL Calculated 02/09/2022 82  0 - 99 mg/dL Final    PSA 02/09/2022 0.5  0.0 - 4.0 ng/mL Final    Comment: [employer] - innovate | mentor | investIA methodology. According to the American Urological Association, Serum PSA should  decrease and remain at undetectable levels after radical  prostatectomy. The AUA defines biochemical recurrence as an initial  PSA value 0.2 ng/mL or greater followed by a subsequent confirmatory  PSA value 0.2 ng/mL or greater. Values obtained with different assay methods or kits cannot be used  interchangeably. Results cannot be interpreted as absolute evidence  of the presence or absence of malignant disease.       Glucose 02/09/2022 100 (A)  65 - 99 mg/dL Final    BUN 02/09/2022 16  6 - 24 mg/dL Final    Creatinine 02/09/2022 1.08  0.76 - 1.27 mg/dL Final    EGFR IF NonAfrican American 02/09/2022 79  >59 mL/min/1.73 Final    GFR  02/09/2022 91  >59 mL/min/1.73 Final    Comment: **In accordance with recommendations from the NKF-ASN Task force,**    [facility] is in the process of updating its eGFR calculation to the    2021 CKD-EPI creatinine equation that estimates kidney function    without a race variable.       Bun/Cre Ratio 02/09/2022 15  9 - 20 NA Final    Sodium 02/09/2022 141  134 - 144 mmol/L Final    Potassium 02/09/2022 4.8  3.5 - 5.2 mmol/L Final    Chloride 02/09/2022 100  96 - 106 mmol/L Final    CO2 02/09/2022 21  20 - 29 mmol/L Final    Calcium 02/09/2022 10.0  8.7 - 10.2 mg/dL Final    Total Protein 02/09/2022 7.8  6.0 - 8.5 g/dL Final    Albumin 02/09/2022 4.8  3.8 - 4.9 g/dL Final    Globulin, Total 02/09/2022 3.0  1.5 - 4.5 g/dL Final    Albumin/Globulin Ratio 02/09/2022 1.6  1.2 - 2.2 NA Final    Total Bilirubin 02/09/2022 0.5  0.0 - 1.2 mg/dL Final    Alkaline Phosphatase 02/09/2022 70  44 - 121 IU/L Final    AST 02/09/2022 28  0 - 40 IU/L Final    ALT 02/09/2022 40  0 - 44 IU/L Final    Uric Acid, Serum 02/09/2022 7.4  3.8 - 8.4 mg/dL Final               Therapeutic target for gout patients: <6.0    WBC 02/09/2022 6.9  3.4 - 10.8 x10E3/uL Final    RBC 02/09/2022 4.74  4.14 - 5.80 x10E6/uL Final    Hemoglobin 02/09/2022 15.3  13.0 - 17.7 g/dL Final    Hematocrit 02/09/2022 45.5  37.5 - 51.0 % Final    MCV 02/09/2022 96  79 - 97 fL Final    MCH 02/09/2022 32.3  26.6 - 33.0 pg Final    MCHC 02/09/2022 33.6  31.5 - 35.7 g/dL Final    RDW 02/09/2022 13.5  11.6 - 15.4 % Final    Platelets 32/05/1154 227  150 - 450 x10E3/uL Final    Neutrophils % 02/09/2022 56  Not Estab. % Final    Lymphocytes % 02/09/2022 26  Not Estab. % Final    Monocytes % 02/09/2022 15  Not Estab. % Final    Eosinophils % 02/09/2022 3  Not Estab. % Final    Basophils % 02/09/2022 0  Not Estab. % Final    Neutrophils Absolute 02/09/2022 3.8  1.4 - 7.0 x10E3/uL Final    Lymphocytes Absolute 02/09/2022 1.8  0.7 - 3.1 x10E3/uL Final    Monocytes Absolute 02/09/2022 1.1 (A)  0.1 - 0.9 x10E3/uL Final    Eosinophils Absolute 02/09/2022 0.2  0.0 - 0.4 x10E3/uL Final    Basophils Absolute 02/09/2022 0.0  0.0 - 0.2 x10E3/uL Final    Immature Granulocytes 02/09/2022 0  Not Estab. % Final    Granulocyte Absolute Count 02/09/2022 0.0  0.0 - 0.1 x10E3/uL Final         Assessent & Plan:        1. COVID-19  Overview:  Patient is over 48 with risk factors (obesity). Will treat with Paxlovid. Patient advised to not take rosuvastatin while on Paxlovid. Patient has not had an attack of gout and has not used colchicine in over 6 months. He was advised to not take colchicine while on Paxlovid. Orders:  -     nirmatrelvir/ritonavir (PAXLOVID) 20 x 150 MG & 10 x 100MG TBPK; Take 3 tablets (two 150 mg nirmatrelvir and one 100 mg ritonavir tablets) by mouth every 12 hours for 5 days. (Patient has normal renal function), Disp-30 tablet, R-0Normal  2. Hyperlipidemia, unspecified hyperlipidemia type  Overview:  2/9/22 total 139 HDL 33 LDL 82  on crestor 20 mg daily. Father had MI age 72. Reviewed the most recent lipid panel with the patient, and interpreted the results within the context of the patient's personal cardiovascular risk factors. Discussed/reinforced a low-cholesterol diet. The patient will continue current intensity statin therapy. Orders:  -     Comprehensive Metabolic Panel; Future  -     Lipid Panel; Future  -     rosuvastatin (CRESTOR) 20 MG tablet; Take 1 tablet by mouth daily, Disp-90 tablet, R-0Normal  3. Class 1 obesity due to excess calories with serious comorbidity and body mass index (BMI) of 32.0 to 32.9 in adult  Overview:  Reviewed the patient's BMI. Discussed the need to lose weight in order to avoid many preventable illnesses. Discussed diet, exercise, and weight loss strategies.        4. Microscopic hematuria  Overview:  Patient reports a history of microhematuria. Non-smoker. Saw Dr. Alma Camacho @ [facility]. Work up including cystoscopy negative. 5. Idiopathic chronic gout of multiple sites without tophus  Overview:  2/9/22 uric acid 7.4 (baseline). Patient uses colchicine PRN to abort attacks. Has on average one gout attack per year. Declines prophylactic allopurinol. 6. Impaired fasting glucose  Overview:  2/9/22     The patient was educated regarding \"prediabetes\" and the risk for progression over time to diabetes mellitus. We discussed strategies to prevent progression including dietary changes, exercise, and weight loss. Orders:  -     Hemoglobin A1C; Future  7. Prostate cancer screening  Overview:  2/9/22 PSA 0.5      Orders:  -     PSA Screening; Future        The patient and/or patient representative voiced understanding and agreement with the current diagnoses, recommendations, and possible side effects. Return in about 2 months (around 3/23/2023) for annual wellness, review labs.

## 2023-02-22 PROBLEM — Z12.5 PROSTATE CANCER SCREENING: Status: RESOLVED | Noted: 2021-10-18 | Resolved: 2023-02-22

## 2023-03-15 DIAGNOSIS — Z12.5 PROSTATE CANCER SCREENING: ICD-10-CM

## 2023-03-15 DIAGNOSIS — R73.01 IMPAIRED FASTING GLUCOSE: ICD-10-CM

## 2023-03-15 DIAGNOSIS — E78.5 HYPERLIPIDEMIA, UNSPECIFIED HYPERLIPIDEMIA TYPE: ICD-10-CM

## 2023-03-15 LAB
ALBUMIN SERPL-MCNC: 4.3 G/DL (ref 3.5–5)
ALBUMIN/GLOB SERPL: 1.4 (ref 0.4–1.6)
ALP SERPL-CCNC: 70 U/L (ref 50–136)
ALT SERPL-CCNC: 50 U/L (ref 12–65)
ANION GAP SERPL CALC-SCNC: 1 MMOL/L (ref 2–11)
AST SERPL-CCNC: 30 U/L (ref 15–37)
BILIRUB SERPL-MCNC: 0.7 MG/DL (ref 0.2–1.1)
BUN SERPL-MCNC: 21 MG/DL (ref 6–23)
CALCIUM SERPL-MCNC: 9.1 MG/DL (ref 8.3–10.4)
CHLORIDE SERPL-SCNC: 108 MMOL/L (ref 101–110)
CHOLEST SERPL-MCNC: 131 MG/DL
CO2 SERPL-SCNC: 30 MMOL/L (ref 21–32)
CREAT SERPL-MCNC: 1 MG/DL (ref 0.8–1.5)
EST. AVERAGE GLUCOSE BLD GHB EST-MCNC: 117 MG/DL
GLOBULIN SER CALC-MCNC: 3 G/DL (ref 2.8–4.5)
GLUCOSE SERPL-MCNC: 93 MG/DL (ref 65–100)
HBA1C MFR BLD: 5.7 % (ref 4.8–5.6)
HDLC SERPL-MCNC: 31 MG/DL (ref 40–60)
HDLC SERPL: 4.2
LDLC SERPL CALC-MCNC: 64.8 MG/DL
POTASSIUM SERPL-SCNC: 4.6 MMOL/L (ref 3.5–5.1)
PROT SERPL-MCNC: 7.3 G/DL (ref 6.3–8.2)
PSA SERPL-MCNC: 0.6 NG/ML
SODIUM SERPL-SCNC: 139 MMOL/L (ref 133–143)
TRIGL SERPL-MCNC: 176 MG/DL (ref 35–150)
VLDLC SERPL CALC-MCNC: 35.2 MG/DL (ref 6–23)

## 2023-03-23 ENCOUNTER — OFFICE VISIT (OUTPATIENT)
Dept: INTERNAL MEDICINE CLINIC | Facility: CLINIC | Age: 53
End: 2023-03-23

## 2023-03-23 VITALS
WEIGHT: 266 LBS | BODY MASS INDEX: 34.14 KG/M2 | HEIGHT: 74 IN | HEART RATE: 66 BPM | DIASTOLIC BLOOD PRESSURE: 80 MMHG | SYSTOLIC BLOOD PRESSURE: 130 MMHG

## 2023-03-23 DIAGNOSIS — R93.1 ELEVATED CORONARY ARTERY CALCIUM SCORE: ICD-10-CM

## 2023-03-23 DIAGNOSIS — Z12.5 PROSTATE CANCER SCREENING: Chronic | ICD-10-CM

## 2023-03-23 DIAGNOSIS — Z23 ENCOUNTER FOR IMMUNIZATION: ICD-10-CM

## 2023-03-23 DIAGNOSIS — Z12.11 COLON CANCER SCREENING: Chronic | ICD-10-CM

## 2023-03-23 DIAGNOSIS — L72.3 SEBACEOUS CYST: ICD-10-CM

## 2023-03-23 DIAGNOSIS — E78.2 HYPERLIPIDEMIA, MIXED: ICD-10-CM

## 2023-03-23 DIAGNOSIS — R73.01 IMPAIRED FASTING GLUCOSE: ICD-10-CM

## 2023-03-23 DIAGNOSIS — E66.09 CLASS 1 OBESITY DUE TO EXCESS CALORIES WITH BODY MASS INDEX (BMI) OF 34.0 TO 34.9 IN ADULT, UNSPECIFIED WHETHER SERIOUS COMORBIDITY PRESENT: ICD-10-CM

## 2023-03-23 DIAGNOSIS — M1A.09X0 IDIOPATHIC CHRONIC GOUT OF MULTIPLE SITES WITHOUT TOPHUS: ICD-10-CM

## 2023-03-23 DIAGNOSIS — Z00.00 ENCOUNTER FOR PREVENTIVE HEALTH EXAMINATION: Primary | Chronic | ICD-10-CM

## 2023-03-23 PROBLEM — U07.1 COVID-19: Status: RESOLVED | Noted: 2023-01-23 | Resolved: 2023-03-23

## 2023-03-23 PROBLEM — R07.9 CHEST PAIN: Status: RESOLVED | Noted: 2017-01-03 | Resolved: 2023-03-23

## 2023-03-23 PROBLEM — L98.9 SKIN LESIONS: Status: RESOLVED | Noted: 2022-07-14 | Resolved: 2023-03-23

## 2023-03-23 SDOH — ECONOMIC STABILITY: FOOD INSECURITY: WITHIN THE PAST 12 MONTHS, YOU WORRIED THAT YOUR FOOD WOULD RUN OUT BEFORE YOU GOT MONEY TO BUY MORE.: NEVER TRUE

## 2023-03-23 SDOH — ECONOMIC STABILITY: HOUSING INSECURITY
IN THE LAST 12 MONTHS, WAS THERE A TIME WHEN YOU DID NOT HAVE A STEADY PLACE TO SLEEP OR SLEPT IN A SHELTER (INCLUDING NOW)?: NO

## 2023-03-23 SDOH — ECONOMIC STABILITY: INCOME INSECURITY: HOW HARD IS IT FOR YOU TO PAY FOR THE VERY BASICS LIKE FOOD, HOUSING, MEDICAL CARE, AND HEATING?: NOT HARD AT ALL

## 2023-03-23 SDOH — ECONOMIC STABILITY: FOOD INSECURITY: WITHIN THE PAST 12 MONTHS, THE FOOD YOU BOUGHT JUST DIDN'T LAST AND YOU DIDN'T HAVE MONEY TO GET MORE.: NEVER TRUE

## 2023-03-23 ASSESSMENT — PATIENT HEALTH QUESTIONNAIRE - PHQ9
SUM OF ALL RESPONSES TO PHQ QUESTIONS 1-9: 0
SUM OF ALL RESPONSES TO PHQ9 QUESTIONS 1 & 2: 0
2. FEELING DOWN, DEPRESSED OR HOPELESS: 0
SUM OF ALL RESPONSES TO PHQ QUESTIONS 1-9: 0
SUM OF ALL RESPONSES TO PHQ QUESTIONS 1-9: 0
1. LITTLE INTEREST OR PLEASURE IN DOING THINGS: 0
SUM OF ALL RESPONSES TO PHQ QUESTIONS 1-9: 0

## 2023-03-23 ASSESSMENT — ENCOUNTER SYMPTOMS
EYE PAIN: 0
RECTAL PAIN: 0
VOICE CHANGE: 0
CHOKING: 0
STRIDOR: 0

## 2023-03-23 NOTE — PROGRESS NOTES
back: Normal range of motion and neck supple. Skin:     General: Skin is warm and dry. Coloration: Skin is not jaundiced. Comments: He has a small 1 cm sebaceous cyst left lateral back. Noninflamed. Neurological:      Mental Status: He is alert and oriented to person, place, and time. Mental status is at baseline. Psychiatric:         Behavior: Behavior normal.         Thought Content: Thought content normal.            Orders Only on 03/15/2023   Component Date Value Ref Range Status    Hemoglobin A1C 03/15/2023 5.7 (A)  4.8 - 5.6 % Final    eAG 03/15/2023 117  mg/dL Final    Comment: Reference Range  Normal: 4.8-5.6  Diabetic >=6.5%  Normal       <5.7%      Cholesterol, Total 03/15/2023 131  <200 MG/DL Final    Comment: Borderline High: 200-239 mg/dL  High: Greater than or equal to 240 mg/dL      Triglycerides 03/15/2023 176 (A)  35 - 150 MG/DL Final    Comment: Borderline High: 150-199 mg/dL, High: 200-499 mg/dL  Very High: Greater than or equal to 500 mg/dL      HDL 03/15/2023 31 (A)  40 - 60 MG/DL Final    LDL Calculated 03/15/2023 64.8  <100 MG/DL Final    Comment: Near Optimal: 100-129 mg/dL  Borderline High: 130-159, High: 160-189 mg/dL  Very High: Greater than or equal to 190 mg/dL      VLDL Cholesterol Calculated 03/15/2023 35.2 (A)  6.0 - 23.0 MG/DL Final    Chol/HDL Ratio 03/15/2023 4.2    Final    Sodium 03/15/2023 139  133 - 143 mmol/L Final    Potassium 03/15/2023 4.6  3.5 - 5.1 mmol/L Final    Chloride 03/15/2023 108  101 - 110 mmol/L Final    CO2 03/15/2023 30  21 - 32 mmol/L Final    Anion Gap 03/15/2023 1 (A)  2 - 11 mmol/L Final    Glucose 03/15/2023 93  65 - 100 mg/dL Final    BUN 03/15/2023 21  6 - 23 MG/DL Final    Creatinine 03/15/2023 1.00  0.8 - 1.5 MG/DL Final    Est, Glom Filt Rate 03/15/2023 >60  >60 ml/min/1.73m2 Final    Comment:   Pediatric calculator link: Wesley.at. org/professionals/kdoqi/gfr_calculatorped    These results are not intended for use in

## 2023-04-20 DIAGNOSIS — E78.5 HYPERLIPIDEMIA, UNSPECIFIED HYPERLIPIDEMIA TYPE: ICD-10-CM

## 2023-04-20 RX ORDER — ROSUVASTATIN CALCIUM 20 MG/1
20 TABLET, COATED ORAL DAILY
Qty: 90 TABLET | Refills: 3 | Status: SHIPPED | OUTPATIENT
Start: 2023-04-20

## 2023-12-30 NOTE — DISCHARGE INSTRUCTIONS
Alternate 4 ibuprofen every 8 hours with 2 extra-strength tylenol every 6 hours  Encourage fluids    Practice social distancing, mask use,      return if worse, particularly for significant shortness of breath,  Consider trying to get a portable finger pulse ox probe from a local pharmacy, or from the Internet.
Yes

## 2024-04-17 DIAGNOSIS — E78.2 HYPERLIPIDEMIA, MIXED: ICD-10-CM

## 2024-04-17 DIAGNOSIS — R73.01 IMPAIRED FASTING GLUCOSE: ICD-10-CM

## 2024-04-17 DIAGNOSIS — Z12.5 PROSTATE CANCER SCREENING: Chronic | ICD-10-CM

## 2024-04-17 LAB
ALBUMIN SERPL-MCNC: 4.1 G/DL (ref 3.5–5)
ALBUMIN/GLOB SERPL: 1.1 (ref 0.4–1.6)
ALP SERPL-CCNC: 77 U/L (ref 50–136)
ALT SERPL-CCNC: 63 U/L (ref 12–65)
ANION GAP SERPL CALC-SCNC: 1 MMOL/L (ref 2–11)
AST SERPL-CCNC: 30 U/L (ref 15–37)
BASOPHILS # BLD: 0 K/UL (ref 0–0.2)
BASOPHILS NFR BLD: 1 % (ref 0–2)
BILIRUB SERPL-MCNC: 0.8 MG/DL (ref 0.2–1.1)
BUN SERPL-MCNC: 15 MG/DL (ref 6–23)
CALCIUM SERPL-MCNC: 9.8 MG/DL (ref 8.3–10.4)
CHLORIDE SERPL-SCNC: 107 MMOL/L (ref 103–113)
CHOLEST SERPL-MCNC: 182 MG/DL
CO2 SERPL-SCNC: 32 MMOL/L (ref 21–32)
CREAT SERPL-MCNC: 1.1 MG/DL (ref 0.8–1.5)
DIFFERENTIAL METHOD BLD: NORMAL
EOSINOPHIL # BLD: 0.5 K/UL (ref 0–0.8)
EOSINOPHIL NFR BLD: 6 % (ref 0.5–7.8)
ERYTHROCYTE [DISTWIDTH] IN BLOOD BY AUTOMATED COUNT: 13.5 % (ref 11.9–14.6)
GLOBULIN SER CALC-MCNC: 3.6 G/DL (ref 2.8–4.5)
GLUCOSE SERPL-MCNC: 104 MG/DL (ref 65–100)
HCT VFR BLD AUTO: 44.9 % (ref 41.1–50.3)
HDLC SERPL-MCNC: 31 MG/DL (ref 40–60)
HDLC SERPL: 5.9
HGB BLD-MCNC: 14.6 G/DL (ref 13.6–17.2)
IMM GRANULOCYTES # BLD AUTO: 0 K/UL (ref 0–0.5)
IMM GRANULOCYTES NFR BLD AUTO: 0 % (ref 0–5)
LDLC SERPL CALC-MCNC: 112.4 MG/DL
LYMPHOCYTES # BLD: 2.5 K/UL (ref 0.5–4.6)
LYMPHOCYTES NFR BLD: 33 % (ref 13–44)
MCH RBC QN AUTO: 32.2 PG (ref 26.1–32.9)
MCHC RBC AUTO-ENTMCNC: 32.5 G/DL (ref 31.4–35)
MCV RBC AUTO: 98.9 FL (ref 82–102)
MONOCYTES # BLD: 0.7 K/UL (ref 0.1–1.3)
MONOCYTES NFR BLD: 9 % (ref 4–12)
NEUTS SEG # BLD: 3.9 K/UL (ref 1.7–8.2)
NEUTS SEG NFR BLD: 51 % (ref 43–78)
NRBC # BLD: 0 K/UL (ref 0–0.2)
PLATELET # BLD AUTO: 261 K/UL (ref 150–450)
PMV BLD AUTO: 11.5 FL (ref 9.4–12.3)
POTASSIUM SERPL-SCNC: 4.7 MMOL/L (ref 3.5–5.1)
PROT SERPL-MCNC: 7.7 G/DL (ref 6.3–8.2)
PSA SERPL-MCNC: 0.8 NG/ML
RBC # BLD AUTO: 4.54 M/UL (ref 4.23–5.6)
SODIUM SERPL-SCNC: 140 MMOL/L (ref 136–146)
TRIGL SERPL-MCNC: 193 MG/DL (ref 35–150)
VLDLC SERPL CALC-MCNC: 38.6 MG/DL (ref 6–23)
WBC # BLD AUTO: 7.6 K/UL (ref 4.3–11.1)

## 2024-04-18 LAB
EST. AVERAGE GLUCOSE BLD GHB EST-MCNC: 117 MG/DL
HBA1C MFR BLD: 5.7 % (ref 4.8–5.6)

## 2024-04-24 SDOH — ECONOMIC STABILITY: INCOME INSECURITY: HOW HARD IS IT FOR YOU TO PAY FOR THE VERY BASICS LIKE FOOD, HOUSING, MEDICAL CARE, AND HEATING?: NOT VERY HARD

## 2024-04-24 SDOH — ECONOMIC STABILITY: FOOD INSECURITY: WITHIN THE PAST 12 MONTHS, THE FOOD YOU BOUGHT JUST DIDN'T LAST AND YOU DIDN'T HAVE MONEY TO GET MORE.: NEVER TRUE

## 2024-04-24 SDOH — ECONOMIC STABILITY: FOOD INSECURITY: WITHIN THE PAST 12 MONTHS, YOU WORRIED THAT YOUR FOOD WOULD RUN OUT BEFORE YOU GOT MONEY TO BUY MORE.: NEVER TRUE

## 2024-04-24 SDOH — ECONOMIC STABILITY: TRANSPORTATION INSECURITY
IN THE PAST 12 MONTHS, HAS LACK OF TRANSPORTATION KEPT YOU FROM MEETINGS, WORK, OR FROM GETTING THINGS NEEDED FOR DAILY LIVING?: NO

## 2024-04-24 ASSESSMENT — PATIENT HEALTH QUESTIONNAIRE - PHQ9
1. LITTLE INTEREST OR PLEASURE IN DOING THINGS: NOT AT ALL
1. LITTLE INTEREST OR PLEASURE IN DOING THINGS: NOT AT ALL
2. FEELING DOWN, DEPRESSED OR HOPELESS: NOT AT ALL
2. FEELING DOWN, DEPRESSED OR HOPELESS: NOT AT ALL
SUM OF ALL RESPONSES TO PHQ QUESTIONS 1-9: 0
SUM OF ALL RESPONSES TO PHQ9 QUESTIONS 1 & 2: 0
SUM OF ALL RESPONSES TO PHQ QUESTIONS 1-9: 0
SUM OF ALL RESPONSES TO PHQ9 QUESTIONS 1 & 2: 0

## 2024-04-25 ENCOUNTER — OFFICE VISIT (OUTPATIENT)
Dept: INTERNAL MEDICINE CLINIC | Facility: CLINIC | Age: 54
End: 2024-04-25
Payer: COMMERCIAL

## 2024-04-25 VITALS
OXYGEN SATURATION: 98 % | HEART RATE: 68 BPM | SYSTOLIC BLOOD PRESSURE: 130 MMHG | DIASTOLIC BLOOD PRESSURE: 80 MMHG | WEIGHT: 268.3 LBS | HEIGHT: 74 IN | BODY MASS INDEX: 34.43 KG/M2

## 2024-04-25 DIAGNOSIS — E78.2 HYPERLIPIDEMIA, MIXED: ICD-10-CM

## 2024-04-25 DIAGNOSIS — R73.01 IMPAIRED FASTING GLUCOSE: ICD-10-CM

## 2024-04-25 DIAGNOSIS — E66.09 CLASS 1 OBESITY DUE TO EXCESS CALORIES WITH BODY MASS INDEX (BMI) OF 34.0 TO 34.9 IN ADULT, UNSPECIFIED WHETHER SERIOUS COMORBIDITY PRESENT: ICD-10-CM

## 2024-04-25 DIAGNOSIS — E78.5 HYPERLIPIDEMIA, UNSPECIFIED HYPERLIPIDEMIA TYPE: ICD-10-CM

## 2024-04-25 DIAGNOSIS — Z12.5 PROSTATE CANCER SCREENING: Chronic | ICD-10-CM

## 2024-04-25 DIAGNOSIS — Z00.00 ENCOUNTER FOR PREVENTIVE HEALTH EXAMINATION: Primary | Chronic | ICD-10-CM

## 2024-04-25 DIAGNOSIS — M1A.09X0 IDIOPATHIC CHRONIC GOUT OF MULTIPLE SITES WITHOUT TOPHUS: ICD-10-CM

## 2024-04-25 DIAGNOSIS — Z23 ENCOUNTER FOR IMMUNIZATION: ICD-10-CM

## 2024-04-25 DIAGNOSIS — R93.1 ELEVATED CORONARY ARTERY CALCIUM SCORE: ICD-10-CM

## 2024-04-25 PROCEDURE — 99396 PREV VISIT EST AGE 40-64: CPT | Performed by: INTERNAL MEDICINE

## 2024-04-25 RX ORDER — ROSUVASTATIN CALCIUM 10 MG/1
10 TABLET, COATED ORAL DAILY
Qty: 90 TABLET | Refills: 1 | Status: SHIPPED | OUTPATIENT
Start: 2024-04-25

## 2024-04-25 RX ORDER — COLCHICINE 0.6 MG/1
0.6 TABLET ORAL DAILY PRN
Qty: 30 TABLET | Refills: 1 | Status: SHIPPED | OUTPATIENT
Start: 2024-04-25

## 2024-04-25 ASSESSMENT — ENCOUNTER SYMPTOMS
RECTAL PAIN: 0
STRIDOR: 0
VOICE CHANGE: 0
EYE PAIN: 0
CHOKING: 0

## 2024-04-25 NOTE — PROGRESS NOTES
examination  Overview:  4/25/24 The patient was seen today for the annual preventive health visit.  The patient was provided anticipatory guidance and counseling regarding age / sex appropriate health maintenance issues including vaccinations, blood pressure screening, lipid screening, cancer screenings, diet, exercise, avoidance of tobacco / substance abuse.     2. Hyperlipidemia, unspecified hyperlipidemia type  -     rosuvastatin (CRESTOR) 10 MG tablet; Take 1 tablet by mouth daily, Disp-90 tablet, R-1Normal  3. Prostate cancer screening  Overview:  4/17/24 PSA 0.8    4. Hyperlipidemia, mixed  Overview:  4/17/24 total 182 HDL 31   on diet therapy.    3/15/23 total 131 HDL 31 LDL 65  on crestor 20 mg daily.      Father had acute \" maker\" MI age 65.  Mother had CABG age 73.  Patient had a coronary artery calcium scan in his mid 40s which revealed moderate disease.  Patient reports muscular aches on crestor 20 mg daily.  Will reduALce dose to 10 mg daily.  Consider adding OTC CoQ10.  Also gave him handouts regarding low cholesterol and low TG diet.    Orders:  -     Lipid Panel; Future  5. Class 1 obesity due to excess calories with body mass index (BMI) of 34.0 to 34.9 in adult, unspecified whether serious comorbidity present  Overview:  Reviewed the patient's BMI.  Discussed the need to lose weight in order to avoid many preventable illnesses. Discussed diet, exercise, and weight loss strategies.       6. Idiopathic chronic gout of multiple sites without tophus  Overview:  2/9/22 uric acid 7.4 (baseline).      Patient uses colchicine PRN to abort attacks.  Has on average one gout attack per year.  Declines prophylactic allopurinol.      Orders:  -     colchicine (COLCRYS) 0.6 MG tablet; Take 1 tablet by mouth daily as needed (gout), Disp-30 tablet, R-1Normal  7. Encounter for immunization  Overview:  Vaccinations reviewed / discussed.  Recommended annual flu / covid booster.  Shingrix

## 2024-05-19 DIAGNOSIS — M1A.09X0 IDIOPATHIC CHRONIC GOUT OF MULTIPLE SITES WITHOUT TOPHUS: ICD-10-CM

## 2024-05-20 RX ORDER — COLCHICINE 0.6 MG/1
0.6 TABLET ORAL DAILY PRN
Qty: 90 TABLET | Refills: 1 | Status: SHIPPED | OUTPATIENT
Start: 2024-05-20

## 2024-05-20 NOTE — TELEPHONE ENCOUNTER
Requested Prescriptions     Pending Prescriptions Disp Refills    colchicine (COLCRYS) 0.6 MG tablet [Pharmacy Med Name: COLCHICINE 0.6 MG TABLET] 90 tablet 1     Sig: TAKE 1 TABLET BY MOUTH DAILY AS NEEDED (GOUT)     Dose verified and to [facility].   90 day supply request

## 2024-06-23 DIAGNOSIS — E78.5 HYPERLIPIDEMIA, UNSPECIFIED HYPERLIPIDEMIA TYPE: ICD-10-CM

## 2024-06-23 DIAGNOSIS — M1A.09X0 IDIOPATHIC CHRONIC GOUT OF MULTIPLE SITES WITHOUT TOPHUS: ICD-10-CM

## 2024-06-24 RX ORDER — ROSUVASTATIN CALCIUM 10 MG/1
10 TABLET, COATED ORAL DAILY
Qty: 90 TABLET | Refills: 1 | Status: SHIPPED | OUTPATIENT
Start: 2024-06-24

## 2024-06-24 RX ORDER — COLCHICINE 0.6 MG/1
0.6 TABLET ORAL DAILY PRN
Qty: 90 TABLET | Refills: 1 | Status: SHIPPED | OUTPATIENT
Start: 2024-06-24

## 2024-07-29 ENCOUNTER — TELEPHONE (OUTPATIENT)
Dept: INTERNAL MEDICINE CLINIC | Facility: CLINIC | Age: 54
End: 2024-07-29

## 2024-07-30 ENCOUNTER — OFFICE VISIT (OUTPATIENT)
Dept: INTERNAL MEDICINE CLINIC | Facility: CLINIC | Age: 54
End: 2024-07-30
Payer: COMMERCIAL

## 2024-07-30 VITALS
TEMPERATURE: 97.7 F | BODY MASS INDEX: 32.47 KG/M2 | DIASTOLIC BLOOD PRESSURE: 80 MMHG | WEIGHT: 253 LBS | HEIGHT: 74 IN | HEART RATE: 98 BPM | SYSTOLIC BLOOD PRESSURE: 130 MMHG

## 2024-07-30 DIAGNOSIS — J01.00 ACUTE NON-RECURRENT MAXILLARY SINUSITIS: ICD-10-CM

## 2024-07-30 DIAGNOSIS — R68.89 FLU-LIKE SYMPTOMS: Primary | ICD-10-CM

## 2024-07-30 LAB
INFLUENZA A ANTIGEN, POC: NEGATIVE
INFLUENZA B ANTIGEN, POC: NEGATIVE
LOT EXPIRE DATE: NORMAL
LOT KIT NUMBER: NORMAL
SARS-COV-2 RNA, POC: NEGATIVE
VALID INTERNAL CONTROL: YES
VENDOR AND KIT NAME POC: NORMAL

## 2024-07-30 PROCEDURE — 99213 OFFICE O/P EST LOW 20 MIN: CPT | Performed by: INTERNAL MEDICINE

## 2024-07-30 PROCEDURE — 87428 SARSCOV & INF VIR A&B AG IA: CPT | Performed by: INTERNAL MEDICINE

## 2024-07-30 PROCEDURE — 1036F TOBACCO NON-USER: CPT | Performed by: INTERNAL MEDICINE

## 2024-07-30 PROCEDURE — 3017F COLORECTAL CA SCREEN DOC REV: CPT | Performed by: INTERNAL MEDICINE

## 2024-07-30 PROCEDURE — G8417 CALC BMI ABV UP PARAM F/U: HCPCS | Performed by: INTERNAL MEDICINE

## 2024-07-30 PROCEDURE — G8428 CUR MEDS NOT DOCUMENT: HCPCS | Performed by: INTERNAL MEDICINE

## 2024-07-30 RX ORDER — AMOXICILLIN AND CLAVULANATE POTASSIUM 875; 125 MG/1; MG/1
1 TABLET, FILM COATED ORAL 2 TIMES DAILY
Qty: 20 TABLET | Refills: 0 | Status: SHIPPED | OUTPATIENT
Start: 2024-07-30 | End: 2024-08-09

## 2024-07-30 ASSESSMENT — ENCOUNTER SYMPTOMS
VOICE CHANGE: 0
RECTAL PAIN: 0
CHOKING: 0
EYE PAIN: 0
STRIDOR: 0

## 2024-07-30 NOTE — PROGRESS NOTES
FOLLOWUP VISIT    Subjective:    Mr. Ceron is a 54 y.o., male,   Chief Complaint   Patient presents with    Fever     W mild sore throat, right sinus pain x started on Friday        HPI:    For the last several days the patient has had right maxillary sinus pain and pressure and postnasal drip.  No dental pain or heat or cold sensitivity.    The following portions of the patient's history were reviewed and updated as appropriate:      Past Medical History:   Diagnosis Date    Gout     History of COVID-19 09/2021    received antibody infusion in ER no hospitalization    Hypercholesterolemia     Hyperlipidemia     Impaired fasting glucose     Microscopic hematuria 10/18/2021       Past Surgical History:   Procedure Laterality Date    COLONOSCOPY N/A 8/17/2022    COLORECTAL CANCER SCREENING, NOT HIGH RISK/ 34 performed by Ronnie William Jr., MD at [facility] ENDOSCOPY    WISDOM TOOTH EXTRACTION         Family History   Problem Relation Age of Onset    Coronary Art Dis Father     Heart Attack Father 65    Stroke Mother     Hypertension Mother        Social History     Socioeconomic History    Marital status:      Spouse name: Not on file    Number of children: Not on file    Years of education: Not on file    Highest education level: Not on file   Occupational History    Not on file   Tobacco Use    Smoking status: Never    Smokeless tobacco: Never    Tobacco comments:     Quit smoking: SMOKED PIPE   Vaping Use    Vaping Use: Never used   Substance and Sexual Activity    Alcohol use: Yes     Comment: rarely    Drug use: Never    Sexual activity: Not on file   Other Topics Concern    Not on file   Social History Narrative         ** Merged History Encounter **     Social Determinants of Health     Financial Resource Strain: Low Risk  (3/23/2023)    Overall Financial Resource Strain (CARDIA)     Difficulty of Paying Living Expenses: Not hard at all   Food Insecurity: Not on file (4/24/2024)   Transportation Needs: Unknown

## 2024-12-04 ENCOUNTER — OFFICE VISIT (OUTPATIENT)
Dept: INTERNAL MEDICINE CLINIC | Facility: CLINIC | Age: 54
End: 2024-12-04

## 2024-12-04 VITALS
WEIGHT: 253.8 LBS | HEART RATE: 71 BPM | DIASTOLIC BLOOD PRESSURE: 80 MMHG | BODY MASS INDEX: 32.57 KG/M2 | HEIGHT: 74 IN | SYSTOLIC BLOOD PRESSURE: 136 MMHG

## 2024-12-04 DIAGNOSIS — S61.211A LACERATION OF LEFT INDEX FINGER WITHOUT FOREIGN BODY WITHOUT DAMAGE TO NAIL, INITIAL ENCOUNTER: Primary | ICD-10-CM

## 2024-12-04 ASSESSMENT — ENCOUNTER SYMPTOMS
EYE PAIN: 0
STRIDOR: 0
RECTAL PAIN: 0
CHOKING: 0
VOICE CHANGE: 0

## 2024-12-04 NOTE — PROGRESS NOTES
Paying Living Expenses: Not hard at all   Food Insecurity: Not on file (4/24/2024)   Transportation Needs: Unknown (3/23/2023)    PRAPARE - Transportation     Lack of Transportation (Medical): Not on file     Lack of Transportation (Non-Medical): No   Physical Activity: Not on file   Stress: Not on file   Social Connections: Unknown (9/7/2021)    Received from [employer]    Social Connections     Frequency of Communication with Friends and Family: Not asked     Frequency of Social Gatherings with Friends and Family: Not asked   Intimate Partner Violence: Unknown (9/7/2021)    Received from [employer]    Intimate Partner Violence     Fear of Current or Ex-Partner: Not asked     Emotionally Abused: Not asked     Physically Abused: Not asked     Sexually Abused: Not asked   Housing Stability: Unknown (3/23/2023)    Housing Stability Vital Sign     Unable to Pay for Housing in the Last Year: Not on file     Number of Places Lived in the Last Year: Not on file     Unstable Housing in the Last Year: No       Current Outpatient Medications   Medication Sig Dispense Refill    rosuvastatin (CRESTOR) 10 MG tablet Take 1 tablet by mouth daily 90 tablet 1    colchicine (COLCRYS) 0.6 MG tablet Take 1 tablet by mouth daily as needed (gout) 90 tablet 1     No current facility-administered medications for this visit.       Allergies as of 12/04/2024    (No Known Allergies)       Review of Systems   Constitutional:  Negative for activity change and appetite change.   HENT:  Negative for drooling and voice change.    Eyes:  Negative for pain.   Respiratory:  Negative for choking and stridor.    Gastrointestinal:  Negative for rectal pain.   Endocrine: Negative for polydipsia and polyphagia.   Genitourinary:  Negative for enuresis and penile pain.   Musculoskeletal:  Negative for gait problem and neck stiffness.   Skin:  Negative for pallor.   Allergic/Immunologic: Negative for immunocompromised

## 2025-05-05 ENCOUNTER — OFFICE VISIT (OUTPATIENT)
Dept: INTERNAL MEDICINE CLINIC | Facility: CLINIC | Age: 55
End: 2025-05-05
Payer: COMMERCIAL

## 2025-05-05 VITALS
DIASTOLIC BLOOD PRESSURE: 90 MMHG | HEIGHT: 74 IN | HEART RATE: 69 BPM | BODY MASS INDEX: 33.62 KG/M2 | WEIGHT: 262 LBS | OXYGEN SATURATION: 99 % | SYSTOLIC BLOOD PRESSURE: 140 MMHG

## 2025-05-05 DIAGNOSIS — Z12.5 PROSTATE CANCER SCREENING: Chronic | ICD-10-CM

## 2025-05-05 DIAGNOSIS — M1A.09X0 IDIOPATHIC CHRONIC GOUT OF MULTIPLE SITES WITHOUT TOPHUS: ICD-10-CM

## 2025-05-05 DIAGNOSIS — E78.2 HYPERLIPIDEMIA, MIXED: ICD-10-CM

## 2025-05-05 DIAGNOSIS — N52.9 ERECTILE DYSFUNCTION, UNSPECIFIED ERECTILE DYSFUNCTION TYPE: Primary | ICD-10-CM

## 2025-05-05 DIAGNOSIS — R73.01 IMPAIRED FASTING GLUCOSE: ICD-10-CM

## 2025-05-05 PROBLEM — N52.8 OTHER MALE ERECTILE DYSFUNCTION: Status: ACTIVE | Noted: 2025-05-05

## 2025-05-05 PROCEDURE — 1036F TOBACCO NON-USER: CPT | Performed by: INTERNAL MEDICINE

## 2025-05-05 PROCEDURE — 99214 OFFICE O/P EST MOD 30 MIN: CPT | Performed by: INTERNAL MEDICINE

## 2025-05-05 PROCEDURE — G8428 CUR MEDS NOT DOCUMENT: HCPCS | Performed by: INTERNAL MEDICINE

## 2025-05-05 PROCEDURE — G8417 CALC BMI ABV UP PARAM F/U: HCPCS | Performed by: INTERNAL MEDICINE

## 2025-05-05 PROCEDURE — 3017F COLORECTAL CA SCREEN DOC REV: CPT | Performed by: INTERNAL MEDICINE

## 2025-05-05 RX ORDER — SILDENAFIL 50 MG/1
50 TABLET, FILM COATED ORAL PRN
Qty: 30 TABLET | Refills: 0 | Status: SHIPPED | OUTPATIENT
Start: 2025-05-05

## 2025-05-05 SDOH — ECONOMIC STABILITY: FOOD INSECURITY: WITHIN THE PAST 12 MONTHS, YOU WORRIED THAT YOUR FOOD WOULD RUN OUT BEFORE YOU GOT MONEY TO BUY MORE.: NEVER TRUE

## 2025-05-05 SDOH — ECONOMIC STABILITY: FOOD INSECURITY: WITHIN THE PAST 12 MONTHS, THE FOOD YOU BOUGHT JUST DIDN'T LAST AND YOU DIDN'T HAVE MONEY TO GET MORE.: NEVER TRUE

## 2025-05-05 ASSESSMENT — PATIENT HEALTH QUESTIONNAIRE - PHQ9
1. LITTLE INTEREST OR PLEASURE IN DOING THINGS: NOT AT ALL
SUM OF ALL RESPONSES TO PHQ QUESTIONS 1-9: 0
2. FEELING DOWN, DEPRESSED OR HOPELESS: NOT AT ALL
SUM OF ALL RESPONSES TO PHQ QUESTIONS 1-9: 0

## 2025-05-05 ASSESSMENT — ENCOUNTER SYMPTOMS
VOICE CHANGE: 0
EYE PAIN: 0
STRIDOR: 0
RECTAL PAIN: 0
CHOKING: 0

## 2025-05-05 NOTE — PROGRESS NOTES
FOLLOWUP VISIT    Subjective:    Mr. Ceron is a 54 y.o., male,   Chief Complaint   Patient presents with    Erectile Dysfunction       HPI:    Patient has noted mild ED.   Libido intact.  No other hypogonad symptoms.  Patient is aging with hyperlipidemia and prediabetes and strong family history of cardiovascular disease (ie risk factors for microcirculatory disorder).      He has hypercholesterolemia.  He tolerated lower dose rosuvastatin 10 mg daily without side effect but admits that he has not been consistently compliant with medication therapy.      He has had rare gout aborted after a few doses of colchicine.      The following portions of the patient's history were reviewed and updated as appropriate:      Past Medical History:   Diagnosis Date    Gout     History of COVID-19 09/2021    received antibody infusion in ER no hospitalization    Hypercholesterolemia     Hyperlipidemia     Impaired fasting glucose     Microscopic hematuria 10/18/2021       Past Surgical History:   Procedure Laterality Date    COLONOSCOPY N/A 8/17/2022    COLORECTAL CANCER SCREENING, NOT HIGH RISK/ 34 performed by Ronnie William Jr., MD at [facility] ENDOSCOPY    WISDOM TOOTH EXTRACTION         Family History   Problem Relation Age of Onset    Coronary Art Dis Father     Heart Attack Father 65    Stroke Mother     Hypertension Mother        Social History     Socioeconomic History    Marital status:      Spouse name: Not on file    Number of children: Not on file    Years of education: Not on file    Highest education level: Not on file   Occupational History    Not on file   Tobacco Use    Smoking status: Never    Smokeless tobacco: Never    Tobacco comments:     Quit smoking: SMOKED PIPE   Vaping Use    Vaping status: Never Used   Substance and Sexual Activity    Alcohol use: Yes     Comment: rarely    Drug use: Never    Sexual activity: Not on file   Other Topics Concern    Not on file   Social History Narrative         ** Merged

## 2025-05-30 DIAGNOSIS — M1A.09X0 IDIOPATHIC CHRONIC GOUT OF MULTIPLE SITES WITHOUT TOPHUS: ICD-10-CM

## 2025-05-30 DIAGNOSIS — R73.01 IMPAIRED FASTING GLUCOSE: ICD-10-CM

## 2025-05-30 DIAGNOSIS — E78.2 HYPERLIPIDEMIA, MIXED: ICD-10-CM

## 2025-05-30 DIAGNOSIS — Z12.5 PROSTATE CANCER SCREENING: Chronic | ICD-10-CM

## 2025-05-30 LAB
ALBUMIN SERPL-MCNC: 3.9 G/DL (ref 3.5–5)
ALBUMIN/GLOB SERPL: 1 (ref 1–1.9)
ALP SERPL-CCNC: 75 U/L (ref 40–129)
ALT SERPL-CCNC: 36 U/L (ref 8–55)
ANION GAP SERPL CALC-SCNC: 11 MMOL/L (ref 7–16)
AST SERPL-CCNC: 30 U/L (ref 15–37)
BASOPHILS # BLD: 0.06 K/UL (ref 0–0.2)
BASOPHILS NFR BLD: 0.8 % (ref 0–2)
BILIRUB SERPL-MCNC: 0.6 MG/DL (ref 0–1.2)
BUN SERPL-MCNC: 24 MG/DL (ref 6–23)
CALCIUM SERPL-MCNC: 9.7 MG/DL (ref 8.8–10.2)
CHLORIDE SERPL-SCNC: 101 MMOL/L (ref 98–107)
CHOLEST SERPL-MCNC: 126 MG/DL (ref 0–200)
CO2 SERPL-SCNC: 26 MMOL/L (ref 20–29)
CREAT SERPL-MCNC: 1.15 MG/DL (ref 0.8–1.3)
DIFFERENTIAL METHOD BLD: NORMAL
EOSINOPHIL # BLD: 0.27 K/UL (ref 0–0.8)
EOSINOPHIL NFR BLD: 3.5 % (ref 0.5–7.8)
ERYTHROCYTE [DISTWIDTH] IN BLOOD BY AUTOMATED COUNT: 13 % (ref 11.9–14.6)
EST. AVERAGE GLUCOSE BLD GHB EST-MCNC: 130 MG/DL
GLOBULIN SER CALC-MCNC: 3.7 G/DL (ref 2.3–3.5)
GLUCOSE SERPL-MCNC: 91 MG/DL (ref 70–99)
HBA1C MFR BLD: 6.2 % (ref 0–5.6)
HCT VFR BLD AUTO: 42.3 % (ref 41.1–50.3)
HDLC SERPL-MCNC: 25 MG/DL (ref 40–60)
HDLC SERPL: 5 (ref 0–5)
HGB BLD-MCNC: 13.9 G/DL (ref 13.6–17.2)
IMM GRANULOCYTES # BLD AUTO: 0.03 K/UL (ref 0–0.5)
IMM GRANULOCYTES NFR BLD AUTO: 0.4 % (ref 0–5)
LDLC SERPL CALC-MCNC: 68 MG/DL (ref 0–100)
LYMPHOCYTES # BLD: 2.59 K/UL (ref 0.5–4.6)
LYMPHOCYTES NFR BLD: 33.6 % (ref 13–44)
MCH RBC QN AUTO: 32 PG (ref 26.1–32.9)
MCHC RBC AUTO-ENTMCNC: 32.9 G/DL (ref 31.4–35)
MCV RBC AUTO: 97.5 FL (ref 82–102)
MONOCYTES # BLD: 0.76 K/UL (ref 0.1–1.3)
MONOCYTES NFR BLD: 9.9 % (ref 4–12)
NEUTS SEG # BLD: 4 K/UL (ref 1.7–8.2)
NEUTS SEG NFR BLD: 51.8 % (ref 43–78)
NRBC # BLD: 0 K/UL (ref 0–0.2)
PLATELET # BLD AUTO: 331 K/UL (ref 150–450)
PMV BLD AUTO: 10.8 FL (ref 9.4–12.3)
POTASSIUM SERPL-SCNC: 4 MMOL/L (ref 3.5–5.1)
PROT SERPL-MCNC: 7.6 G/DL (ref 6.3–8.2)
PSA SERPL-MCNC: 0.8 NG/ML (ref 0–4)
RBC # BLD AUTO: 4.34 M/UL (ref 4.23–5.6)
SODIUM SERPL-SCNC: 139 MMOL/L (ref 136–145)
TRIGL SERPL-MCNC: 166 MG/DL (ref 0–150)
VLDLC SERPL CALC-MCNC: 33 MG/DL (ref 6–23)
WBC # BLD AUTO: 7.7 K/UL (ref 4.3–11.1)

## 2025-06-01 DIAGNOSIS — N52.9 ERECTILE DYSFUNCTION, UNSPECIFIED ERECTILE DYSFUNCTION TYPE: ICD-10-CM

## 2025-06-02 RX ORDER — SILDENAFIL 50 MG/1
50 TABLET, FILM COATED ORAL PRN
Qty: 30 TABLET | Refills: 0 | Status: SHIPPED | OUTPATIENT
Start: 2025-06-02

## 2025-06-02 NOTE — TELEPHONE ENCOUNTER
Requested Prescriptions     Pending Prescriptions Disp Refills    sildenafil (VIAGRA) 50 MG tablet [Pharmacy Med Name: SILDENAFIL 50 MG TABLET] 30 tablet 0     Sig: TAKE 1 TABLET BY MOUTH AS NEEDED FOR ERECTILE DYSFUNCTION     [facility]. Patient is scheduled for follow up visit.

## 2025-06-05 ENCOUNTER — OFFICE VISIT (OUTPATIENT)
Dept: INTERNAL MEDICINE CLINIC | Facility: CLINIC | Age: 55
End: 2025-06-05
Payer: COMMERCIAL

## 2025-06-05 VITALS
DIASTOLIC BLOOD PRESSURE: 78 MMHG | BODY MASS INDEX: 32.67 KG/M2 | HEIGHT: 74 IN | SYSTOLIC BLOOD PRESSURE: 140 MMHG | OXYGEN SATURATION: 98 % | WEIGHT: 254.6 LBS | HEART RATE: 62 BPM

## 2025-06-05 DIAGNOSIS — E78.2 HYPERLIPIDEMIA, MIXED: ICD-10-CM

## 2025-06-05 DIAGNOSIS — Z23 ENCOUNTER FOR IMMUNIZATION: ICD-10-CM

## 2025-06-05 DIAGNOSIS — Z12.5 PROSTATE CANCER SCREENING: Chronic | ICD-10-CM

## 2025-06-05 DIAGNOSIS — M1A.09X0 IDIOPATHIC CHRONIC GOUT OF MULTIPLE SITES WITHOUT TOPHUS: ICD-10-CM

## 2025-06-05 DIAGNOSIS — R93.1 ELEVATED CORONARY ARTERY CALCIUM SCORE: ICD-10-CM

## 2025-06-05 DIAGNOSIS — R73.01 IMPAIRED FASTING GLUCOSE: ICD-10-CM

## 2025-06-05 DIAGNOSIS — Z00.00 ENCOUNTER FOR PREVENTIVE HEALTH EXAMINATION: Primary | Chronic | ICD-10-CM

## 2025-06-05 DIAGNOSIS — J31.0 RHINITIS MEDICAMENTOSA: ICD-10-CM

## 2025-06-05 DIAGNOSIS — T48.5X5A RHINITIS MEDICAMENTOSA: ICD-10-CM

## 2025-06-05 PROCEDURE — 99396 PREV VISIT EST AGE 40-64: CPT | Performed by: INTERNAL MEDICINE

## 2025-06-05 ASSESSMENT — ENCOUNTER SYMPTOMS
EYE PAIN: 0
CHOKING: 0
RECTAL PAIN: 0
VOICE CHANGE: 0
STRIDOR: 0

## 2025-06-05 NOTE — PROGRESS NOTES
which revealed moderate disease.  Patient reported muscular aches on crestor 20 mg daily but tolerates 10 mg daily.  Update labs before next visit.  Goal LDL < 70 given high risk factors.  Encouraged medication compliance.  The patient will continue the current treatment.     Orders:  -     Lipid Panel; Future  3. Encounter for immunization  Overview:  Vaccinations reviewed / discussed.  Recommended annual flu / covid booster.  Shingrix discussed.  Tdap recommended.  Patient is not enthusiastic about many vaccinations and declines at this time.    4. Impaired fasting glucose  Overview:  5/30/25 A1C 6.2  4/17/24  A1C 5.7  3/15/23 FBS 93, A1C 5.7 (normal < 5.7).  2/9/22     Labs were reviewed and discussed with patient.  The patient was educated regarding \"prediabetes\" and the risk for progression over time to diabetes mellitus.  We discussed strategies to prevent progression including dietary changes, exercise, and weight loss.     Orders:  -     Basic Metabolic Panel; Future  -     Hemoglobin A1C; Future  5. Elevated coronary artery calcium score  Overview:  Patient reports a \"moderate\" coronary artery calcium score performed in his mid 40s.  Father had sudden MI age 65.   6. Prostate cancer screening  Overview:  5/30/25 PSA 0.8  4/17/24 PSA 0.8    7. Idiopathic chronic gout of multiple sites without tophus  Overview:  2/9/22 uric acid 7.4 (baseline).      Patient uses colchicine PRN to abort attacks.  Has on average one gout attack per year.  Declines prophylactic allopurinol.      8. Rhinitis medicamentosa  Overview:  Discussed with patient at length.  Stop Afrin.  Offered nasal steroid to speed recovery from Afrin use... he declines for now.  Call if symptoms do not improve.        The patient and/or patient representative voiced understanding and agreement with the current diagnoses, recommendations, and possible side effects.    Return in about 6 months (around 12/5/2025) for follow up of chronic

## 2025-06-29 DIAGNOSIS — M1A.09X0 IDIOPATHIC CHRONIC GOUT OF MULTIPLE SITES WITHOUT TOPHUS: ICD-10-CM

## 2025-06-30 RX ORDER — COLCHICINE 0.6 MG/1
0.6 TABLET ORAL DAILY PRN
Qty: 90 TABLET | Refills: 1 | Status: SHIPPED | OUTPATIENT
Start: 2025-06-30

## 2025-06-30 NOTE — TELEPHONE ENCOUNTER
Requested Prescriptions     Pending Prescriptions Disp Refills    colchicine (COLCRYS) 0.6 MG tablet 90 tablet 1     Sig: Take 1 tablet by mouth daily as needed (gout)     [facility]. Patient is aware to schedule 6 months follow up with labs done around 12/05/2025.

## 2025-07-01 DIAGNOSIS — N52.9 ERECTILE DYSFUNCTION, UNSPECIFIED ERECTILE DYSFUNCTION TYPE: ICD-10-CM

## 2025-07-01 RX ORDER — SILDENAFIL 50 MG/1
50 TABLET, FILM COATED ORAL PRN
Qty: 30 TABLET | Refills: 0 | Status: SHIPPED | OUTPATIENT
Start: 2025-07-01

## 2025-07-01 NOTE — TELEPHONE ENCOUNTER
Requested Prescriptions     Pending Prescriptions Disp Refills    sildenafil (VIAGRA) 50 MG tablet [Pharmacy Med Name: SILDENAFIL 50 MG TABLET] 30 tablet 0     Sig: TAKE 1 TABLET BY MOUTH AS NEEDED FOR ERECTILE DYSFUNCTION     [facility]. [employer] message sent to patient to schedule 6 months follow up. Last seen on 06/05/2025

## 2025-07-14 DIAGNOSIS — E78.5 HYPERLIPIDEMIA, UNSPECIFIED HYPERLIPIDEMIA TYPE: ICD-10-CM

## 2025-07-14 RX ORDER — ROSUVASTATIN CALCIUM 10 MG/1
10 TABLET, COATED ORAL DAILY
Qty: 90 TABLET | Refills: 1 | Status: SHIPPED | OUTPATIENT
Start: 2025-07-14

## (undated) DEVICE — CANNULA NSL ORAL AD FOR CAPNOFLEX CO2 O2 AIRLFE

## (undated) DEVICE — SINGLE PORT MANIFOLD: Brand: NEPTUNE 2

## (undated) DEVICE — KENDALL RADIOLUCENT FOAM MONITORING ELECTRODE RECTANGULAR SHAPE: Brand: KENDALL

## (undated) DEVICE — LUBE JELLY FOIL PACK 1.4 OZ: Brand: MEDLINE INDUSTRIES, INC.

## (undated) DEVICE — SYRINGE, LUER SLIP, STERILE, 60ML: Brand: MEDLINE

## (undated) DEVICE — SYRINGE MED 3ML CLR PLAS STD N CTRL LUERLOCK TIP DISP

## (undated) DEVICE — THE TORRENT IRRIGATION TUBING IS INTENDED TO PROVIDE IRRIGATION VIA IRRIGATION FLUIDS, SUCH AS STERILE WATER, DURING GASTROINTESTINAL ENDOSCOPIC PROCEDURES WHEN USED IN CONJUNCTION WITH AN IRRIGATION PUMP OR ELECTROSURGICAL UNIT.: Brand: TORRENT

## (undated) DEVICE — GAUZE,SPONGE,4"X4",12PLY,WOVEN,NS,LF: Brand: MEDLINE

## (undated) DEVICE — NEEDLE SYR 18GA L1.5IN RED PLAS HUB S STL BLNT FILL W/O

## (undated) DEVICE — YANKAUER,BULB TIP,W/O VENT,RIGID,STERILE: Brand: MEDLINE

## (undated) DEVICE — SYRINGE MED 10ML LUERLOCK TIP W/O SFTY DISP

## (undated) DEVICE — CONNECTOR TBNG OD5-7MM O2 END DISP

## (undated) DEVICE — AIRLIFE™ OXYGEN TUBING 7 FEET (2.1 M) CRUSH RESISTANT OXYGEN TUBING, VINYL TIPPED: Brand: AIRLIFE™